# Patient Record
Sex: MALE | Race: WHITE | Employment: OTHER | URBAN - METROPOLITAN AREA
[De-identification: names, ages, dates, MRNs, and addresses within clinical notes are randomized per-mention and may not be internally consistent; named-entity substitution may affect disease eponyms.]

---

## 2017-01-03 ENCOUNTER — APPOINTMENT (OUTPATIENT)
Dept: GENERAL RADIOLOGY | Facility: HOSPITAL | Age: 47
DRG: 896 | End: 2017-01-03
Attending: INTERNAL MEDICINE
Payer: MEDICARE

## 2017-01-03 PROCEDURE — 71010 XR CHEST AP PORTABLE  (CPT=71010): CPT

## 2017-07-12 ENCOUNTER — CHARTING TRANS (OUTPATIENT)
Dept: OTHER | Age: 47
End: 2017-07-12

## 2017-10-11 PROBLEM — G47.24: Status: ACTIVE | Noted: 2017-10-11

## 2017-10-11 PROBLEM — H35.179 RETROLENTAL FIBROPLASIA: Status: ACTIVE | Noted: 2017-10-11

## 2017-10-11 PROCEDURE — 84153 ASSAY OF PSA TOTAL: CPT | Performed by: INTERNAL MEDICINE

## 2017-10-11 PROCEDURE — 82746 ASSAY OF FOLIC ACID SERUM: CPT | Performed by: INTERNAL MEDICINE

## 2017-10-11 PROCEDURE — 82607 VITAMIN B-12: CPT | Performed by: INTERNAL MEDICINE

## 2018-01-01 ENCOUNTER — ANESTHESIA EVENT (OUTPATIENT)
Dept: SURGERY | Facility: HOSPITAL | Age: 48
DRG: 493 | End: 2018-01-01
Payer: MEDICARE

## 2018-01-01 ENCOUNTER — ANESTHESIA (OUTPATIENT)
Dept: SURGERY | Facility: HOSPITAL | Age: 48
DRG: 493 | End: 2018-01-01
Payer: MEDICARE

## 2018-01-01 ENCOUNTER — APPOINTMENT (OUTPATIENT)
Dept: CT IMAGING | Facility: HOSPITAL | Age: 48
DRG: 493 | End: 2018-01-01
Attending: EMERGENCY MEDICINE
Payer: MEDICARE

## 2018-01-01 ENCOUNTER — APPOINTMENT (OUTPATIENT)
Dept: GENERAL RADIOLOGY | Facility: HOSPITAL | Age: 48
DRG: 493 | End: 2018-01-01
Attending: EMERGENCY MEDICINE
Payer: MEDICARE

## 2018-01-01 ENCOUNTER — APPOINTMENT (OUTPATIENT)
Dept: CT IMAGING | Facility: HOSPITAL | Age: 48
DRG: 493 | End: 2018-01-01
Attending: ORTHOPAEDIC SURGERY
Payer: MEDICARE

## 2018-01-01 ENCOUNTER — HOSPITAL ENCOUNTER (INPATIENT)
Facility: HOSPITAL | Age: 48
LOS: 11 days | Discharge: NURSING, REHAB OR LONG-TERM CARE | DRG: 493 | End: 2018-01-01
Attending: EMERGENCY MEDICINE | Admitting: INTERNAL MEDICINE
Payer: MEDICARE

## 2018-01-01 ENCOUNTER — APPOINTMENT (OUTPATIENT)
Dept: GENERAL RADIOLOGY | Facility: HOSPITAL | Age: 48
DRG: 493 | End: 2018-01-01
Attending: ORTHOPAEDIC SURGERY
Payer: MEDICARE

## 2018-01-01 ENCOUNTER — APPOINTMENT (OUTPATIENT)
Dept: GENERAL RADIOLOGY | Facility: HOSPITAL | Age: 48
DRG: 493 | End: 2018-01-01
Attending: HOSPITALIST
Payer: MEDICARE

## 2018-01-01 VITALS
TEMPERATURE: 98 F | DIASTOLIC BLOOD PRESSURE: 67 MMHG | OXYGEN SATURATION: 97 % | SYSTOLIC BLOOD PRESSURE: 97 MMHG | WEIGHT: 161.69 LBS | RESPIRATION RATE: 18 BRPM | BODY MASS INDEX: 22.64 KG/M2 | HEIGHT: 71 IN | HEART RATE: 145 BPM

## 2018-01-01 DIAGNOSIS — S82.892A CLOSED FRACTURE OF LEFT ANKLE, INITIAL ENCOUNTER: Primary | ICD-10-CM

## 2018-01-01 DIAGNOSIS — F10.10 ALCOHOL ABUSE: ICD-10-CM

## 2018-01-01 DIAGNOSIS — D69.6 THROMBOCYTOPENIA (HCC): ICD-10-CM

## 2018-01-01 DIAGNOSIS — S09.90XA INJURY OF HEAD, INITIAL ENCOUNTER: ICD-10-CM

## 2018-01-01 PROCEDURE — 99233 SBSQ HOSP IP/OBS HIGH 50: CPT | Performed by: OTHER

## 2018-01-01 PROCEDURE — 0QSK04Z REPOSITION LEFT FIBULA WITH INTERNAL FIXATION DEVICE, OPEN APPROACH: ICD-10-PCS | Performed by: ORTHOPAEDIC SURGERY

## 2018-01-01 PROCEDURE — 0QSHXZZ REPOSITION LEFT TIBIA, EXTERNAL APPROACH: ICD-10-PCS | Performed by: ORTHOPAEDIC SURGERY

## 2018-01-01 PROCEDURE — 76001 XR C-ARM FLUORO >1 HOUR  (CPT=76001): CPT | Performed by: ORTHOPAEDIC SURGERY

## 2018-01-01 PROCEDURE — 0QSH04Z REPOSITION LEFT TIBIA WITH INTERNAL FIXATION DEVICE, OPEN APPROACH: ICD-10-PCS | Performed by: ORTHOPAEDIC SURGERY

## 2018-01-01 PROCEDURE — 0SSG04Z REPOSITION LEFT ANKLE JOINT WITH INTERNAL FIXATION DEVICE, OPEN APPROACH: ICD-10-PCS | Performed by: ORTHOPAEDIC SURGERY

## 2018-01-01 PROCEDURE — 70450 CT HEAD/BRAIN W/O DYE: CPT | Performed by: EMERGENCY MEDICINE

## 2018-01-01 PROCEDURE — 30233R1 TRANSFUSION OF NONAUTOLOGOUS PLATELETS INTO PERIPHERAL VEIN, PERCUTANEOUS APPROACH: ICD-10-PCS | Performed by: INTERNAL MEDICINE

## 2018-01-01 PROCEDURE — 71045 X-RAY EXAM CHEST 1 VIEW: CPT | Performed by: HOSPITALIST

## 2018-01-01 PROCEDURE — 90792 PSYCH DIAG EVAL W/MED SRVCS: CPT | Performed by: OTHER

## 2018-01-01 PROCEDURE — 73700 CT LOWER EXTREMITY W/O DYE: CPT | Performed by: ORTHOPAEDIC SURGERY

## 2018-01-01 PROCEDURE — 73610 X-RAY EXAM OF ANKLE: CPT | Performed by: EMERGENCY MEDICINE

## 2018-01-01 PROCEDURE — 0L8P3ZZ DIVISION OF LEFT LOWER LEG TENDON, PERCUTANEOUS APPROACH: ICD-10-PCS | Performed by: ORTHOPAEDIC SURGERY

## 2018-01-01 PROCEDURE — 70486 CT MAXILLOFACIAL W/O DYE: CPT | Performed by: EMERGENCY MEDICINE

## 2018-01-01 DEVICE — IMPLANTABLE DEVICE: Type: IMPLANTABLE DEVICE | Site: ANKLE | Status: FUNCTIONAL

## 2018-01-01 DEVICE — SCREW CORT 3.5X14 2348-14-35: Type: IMPLANTABLE DEVICE | Site: ANKLE | Status: FUNCTIONAL

## 2018-01-01 DEVICE — SCREW CORT 3.5X16 2348-16-35: Type: IMPLANTABLE DEVICE | Site: ANKLE | Status: FUNCTIONAL

## 2018-01-01 DEVICE — ONE (1) PACKAGE CONTAINING 2.5CC
Type: IMPLANTABLE DEVICE | Site: ANKLE | Status: FUNCTIONAL
Brand: OSTEOSELECT PLUS DBM PUTTY 2.5CC

## 2018-01-01 RX ORDER — MAGNESIUM OXIDE 400 MG (241.3 MG MAGNESIUM) TABLET
800 TABLET ONCE
Status: COMPLETED | OUTPATIENT
Start: 2018-01-01 | End: 2018-01-01

## 2018-01-01 RX ORDER — POTASSIUM CHLORIDE 14.9 MG/ML
20 INJECTION INTRAVENOUS ONCE
Status: COMPLETED | OUTPATIENT
Start: 2018-01-01 | End: 2018-01-01

## 2018-01-01 RX ORDER — HYDRALAZINE HYDROCHLORIDE 20 MG/ML
10 INJECTION INTRAMUSCULAR; INTRAVENOUS EVERY 4 HOURS PRN
Status: DISCONTINUED | OUTPATIENT
Start: 2018-01-01 | End: 2018-01-01

## 2018-01-01 RX ORDER — MORPHINE SULFATE 10 MG/ML
6 INJECTION, SOLUTION INTRAMUSCULAR; INTRAVENOUS EVERY 10 MIN PRN
Status: DISCONTINUED | OUTPATIENT
Start: 2018-01-01 | End: 2018-01-01 | Stop reason: HOSPADM

## 2018-01-01 RX ORDER — MORPHINE SULFATE 2 MG/ML
1 INJECTION, SOLUTION INTRAMUSCULAR; INTRAVENOUS EVERY 2 HOUR PRN
Status: DISCONTINUED | OUTPATIENT
Start: 2018-01-01 | End: 2018-01-01

## 2018-01-01 RX ORDER — SODIUM CHLORIDE 9 MG/ML
INJECTION, SOLUTION INTRAVENOUS ONCE
Status: DISCONTINUED | OUTPATIENT
Start: 2018-01-01 | End: 2018-01-01

## 2018-01-01 RX ORDER — DIAZEPAM 10 MG/1
10 TABLET ORAL 2 TIMES DAILY
Status: DISCONTINUED | OUTPATIENT
Start: 2018-01-01 | End: 2018-01-01

## 2018-01-01 RX ORDER — POTASSIUM CHLORIDE 20 MEQ/1
40 TABLET, EXTENDED RELEASE ORAL ONCE
Status: COMPLETED | OUTPATIENT
Start: 2018-01-01 | End: 2018-01-01

## 2018-01-01 RX ORDER — MAGNESIUM SULFATE HEPTAHYDRATE 40 MG/ML
2 INJECTION, SOLUTION INTRAVENOUS ONCE
Status: COMPLETED | OUTPATIENT
Start: 2018-01-01 | End: 2018-01-01

## 2018-01-01 RX ORDER — SODIUM CHLORIDE 0.9 % (FLUSH) 0.9 %
10 SYRINGE (ML) INJECTION AS NEEDED
Status: DISCONTINUED | OUTPATIENT
Start: 2018-01-01 | End: 2018-01-01

## 2018-01-01 RX ORDER — MELATONIN
325 2 TIMES DAILY WITH MEALS
Qty: 60 TABLET | Refills: 0 | Status: ON HOLD | OUTPATIENT
Start: 2018-01-01 | End: 2019-01-01

## 2018-01-01 RX ORDER — PHENYLEPHRINE HCL 10 MG/ML
VIAL (ML) INJECTION AS NEEDED
Status: DISCONTINUED | OUTPATIENT
Start: 2018-01-01 | End: 2018-01-01 | Stop reason: SURG

## 2018-01-01 RX ORDER — SODIUM PHOSPHATE, DIBASIC AND SODIUM PHOSPHATE, MONOBASIC 7; 19 G/133ML; G/133ML
1 ENEMA RECTAL ONCE AS NEEDED
Status: DISCONTINUED | OUTPATIENT
Start: 2018-01-01 | End: 2018-01-01

## 2018-01-01 RX ORDER — MORPHINE SULFATE 4 MG/ML
2 INJECTION, SOLUTION INTRAMUSCULAR; INTRAVENOUS EVERY 10 MIN PRN
Status: DISCONTINUED | OUTPATIENT
Start: 2018-01-01 | End: 2018-01-01 | Stop reason: HOSPADM

## 2018-01-01 RX ORDER — HYDROCODONE BITARTRATE AND ACETAMINOPHEN 5; 325 MG/1; MG/1
1 TABLET ORAL EVERY 6 HOURS PRN
Status: DISCONTINUED | OUTPATIENT
Start: 2018-01-01 | End: 2018-01-01

## 2018-01-01 RX ORDER — ONDANSETRON 2 MG/ML
4 INJECTION INTRAMUSCULAR; INTRAVENOUS EVERY 6 HOURS PRN
Status: DISCONTINUED | OUTPATIENT
Start: 2018-01-01 | End: 2018-01-01

## 2018-01-01 RX ORDER — DIPHENHYDRAMINE HCL 25 MG
25 CAPSULE ORAL EVERY 6 HOURS PRN
Status: DISCONTINUED | OUTPATIENT
Start: 2018-01-01 | End: 2018-01-01

## 2018-01-01 RX ORDER — ACETAMINOPHEN 325 MG/1
650 TABLET ORAL ONCE
Status: COMPLETED | OUTPATIENT
Start: 2018-01-01 | End: 2018-01-01

## 2018-01-01 RX ORDER — BISACODYL 10 MG
10 SUPPOSITORY, RECTAL RECTAL
Status: DISCONTINUED | OUTPATIENT
Start: 2018-01-01 | End: 2018-01-01

## 2018-01-01 RX ORDER — SODIUM CHLORIDE 9 MG/ML
INJECTION, SOLUTION INTRAVENOUS CONTINUOUS
Status: DISCONTINUED | OUTPATIENT
Start: 2018-01-01 | End: 2018-01-01

## 2018-01-01 RX ORDER — HYDRALAZINE HYDROCHLORIDE 20 MG/ML
5 INJECTION INTRAMUSCULAR; INTRAVENOUS ONCE
Status: COMPLETED | OUTPATIENT
Start: 2018-01-01 | End: 2018-01-01

## 2018-01-01 RX ORDER — ACETAMINOPHEN 325 MG/1
650 TABLET ORAL ONCE
Status: DISCONTINUED | OUTPATIENT
Start: 2018-01-01 | End: 2018-01-01

## 2018-01-01 RX ORDER — POTASSIUM CHLORIDE 14.9 MG/ML
20 INJECTION INTRAVENOUS ONCE
Status: DISCONTINUED | OUTPATIENT
Start: 2018-01-01 | End: 2018-01-01

## 2018-01-01 RX ORDER — LORAZEPAM 1 MG/1
2 TABLET ORAL
Status: DISCONTINUED | OUTPATIENT
Start: 2018-01-01 | End: 2018-01-01

## 2018-01-01 RX ORDER — LORAZEPAM 2 MG/ML
1 INJECTION INTRAMUSCULAR ONCE
Status: DISCONTINUED | OUTPATIENT
Start: 2018-01-01 | End: 2018-01-01

## 2018-01-01 RX ORDER — ONDANSETRON 2 MG/ML
INJECTION INTRAMUSCULAR; INTRAVENOUS AS NEEDED
Status: DISCONTINUED | OUTPATIENT
Start: 2018-01-01 | End: 2018-01-01 | Stop reason: SURG

## 2018-01-01 RX ORDER — DIAZEPAM 5 MG/1
5 TABLET ORAL 4 TIMES DAILY
Status: DISCONTINUED | OUTPATIENT
Start: 2018-01-01 | End: 2018-01-01

## 2018-01-01 RX ORDER — LABETALOL HYDROCHLORIDE 5 MG/ML
5 INJECTION, SOLUTION INTRAVENOUS ONCE
Status: COMPLETED | OUTPATIENT
Start: 2018-01-01 | End: 2018-01-01

## 2018-01-01 RX ORDER — MELATONIN
100 DAILY
Status: DISCONTINUED | OUTPATIENT
Start: 2018-01-01 | End: 2018-01-01

## 2018-01-01 RX ORDER — MORPHINE SULFATE 2 MG/ML
2 INJECTION, SOLUTION INTRAMUSCULAR; INTRAVENOUS EVERY 2 HOUR PRN
Status: DISCONTINUED | OUTPATIENT
Start: 2018-01-01 | End: 2018-01-01

## 2018-01-01 RX ORDER — SODIUM CHLORIDE, SODIUM LACTATE, POTASSIUM CHLORIDE, CALCIUM CHLORIDE 600; 310; 30; 20 MG/100ML; MG/100ML; MG/100ML; MG/100ML
INJECTION, SOLUTION INTRAVENOUS CONTINUOUS
Status: DISCONTINUED | OUTPATIENT
Start: 2018-01-01 | End: 2018-01-01 | Stop reason: HOSPADM

## 2018-01-01 RX ORDER — VENLAFAXINE HYDROCHLORIDE 150 MG/1
150 CAPSULE, EXTENDED RELEASE ORAL DAILY
Status: DISCONTINUED | OUTPATIENT
Start: 2018-01-01 | End: 2018-01-01

## 2018-01-01 RX ORDER — MAGNESIUM OXIDE 400 MG (241.3 MG MAGNESIUM) TABLET
400 TABLET ONCE
Status: COMPLETED | OUTPATIENT
Start: 2018-01-01 | End: 2018-01-01

## 2018-01-01 RX ORDER — MORPHINE SULFATE 4 MG/ML
4 INJECTION, SOLUTION INTRAMUSCULAR; INTRAVENOUS EVERY 10 MIN PRN
Status: DISCONTINUED | OUTPATIENT
Start: 2018-01-01 | End: 2018-01-01 | Stop reason: HOSPADM

## 2018-01-01 RX ORDER — METOCLOPRAMIDE HYDROCHLORIDE 5 MG/ML
10 INJECTION INTRAMUSCULAR; INTRAVENOUS EVERY 8 HOURS PRN
Status: DISCONTINUED | OUTPATIENT
Start: 2018-01-01 | End: 2018-01-01

## 2018-01-01 RX ORDER — DIAZEPAM 5 MG/1
5 TABLET ORAL EVERY 8 HOURS PRN
Status: DISCONTINUED | OUTPATIENT
Start: 2018-01-01 | End: 2018-01-01

## 2018-01-01 RX ORDER — POLYETHYLENE GLYCOL 3350 17 G/17G
17 POWDER, FOR SOLUTION ORAL DAILY PRN
Status: DISCONTINUED | OUTPATIENT
Start: 2018-01-01 | End: 2018-01-01

## 2018-01-01 RX ORDER — DIAZEPAM 5 MG/1
10 TABLET ORAL 4 TIMES DAILY
Status: DISCONTINUED | OUTPATIENT
Start: 2018-01-01 | End: 2018-01-01

## 2018-01-01 RX ORDER — MAGNESIUM SULFATE HEPTAHYDRATE 40 MG/ML
2 INJECTION, SOLUTION INTRAVENOUS ONCE
Status: DISCONTINUED | OUTPATIENT
Start: 2018-01-01 | End: 2018-01-01

## 2018-01-01 RX ORDER — LORAZEPAM 2 MG/ML
2 INJECTION INTRAMUSCULAR
Status: DISCONTINUED | OUTPATIENT
Start: 2018-01-01 | End: 2018-01-01

## 2018-01-01 RX ORDER — DOCUSATE SODIUM 100 MG/1
100 CAPSULE, LIQUID FILLED ORAL 2 TIMES DAILY
Status: DISCONTINUED | OUTPATIENT
Start: 2018-01-01 | End: 2018-01-01

## 2018-01-01 RX ORDER — VENLAFAXINE HYDROCHLORIDE 75 MG/1
75 CAPSULE, EXTENDED RELEASE ORAL DAILY
Qty: 30 CAPSULE | Refills: 0 | Status: SHIPPED | OUTPATIENT
Start: 2018-01-01 | End: 2019-01-01

## 2018-01-01 RX ORDER — FOLIC ACID 1 MG/1
1 TABLET ORAL DAILY
Status: DISCONTINUED | OUTPATIENT
Start: 2018-01-01 | End: 2018-01-01

## 2018-01-01 RX ORDER — MELATONIN
325 2 TIMES DAILY WITH MEALS
Status: DISCONTINUED | OUTPATIENT
Start: 2018-01-01 | End: 2018-01-01

## 2018-01-01 RX ORDER — NALTREXONE HYDROCHLORIDE 50 MG/1
25 TABLET, FILM COATED ORAL DAILY
Status: DISCONTINUED | OUTPATIENT
Start: 2018-01-01 | End: 2018-01-01

## 2018-01-01 RX ORDER — NALOXONE HYDROCHLORIDE 0.4 MG/ML
80 INJECTION, SOLUTION INTRAMUSCULAR; INTRAVENOUS; SUBCUTANEOUS AS NEEDED
Status: DISCONTINUED | OUTPATIENT
Start: 2018-01-01 | End: 2018-01-01 | Stop reason: HOSPADM

## 2018-01-01 RX ORDER — HALOPERIDOL 5 MG/ML
1 INJECTION INTRAMUSCULAR
Status: DISCONTINUED | OUTPATIENT
Start: 2018-01-01 | End: 2018-01-01

## 2018-01-01 RX ORDER — ASPIRIN 325 MG
325 TABLET ORAL 2 TIMES DAILY
Status: DISCONTINUED | OUTPATIENT
Start: 2018-01-01 | End: 2018-01-01

## 2018-01-01 RX ORDER — LORAZEPAM 1 MG/1
1 TABLET ORAL
Status: DISCONTINUED | OUTPATIENT
Start: 2018-01-01 | End: 2018-01-01

## 2018-01-01 RX ORDER — PSEUDOEPHEDRINE HCL 30 MG
100 TABLET ORAL 2 TIMES DAILY
Qty: 60 CAPSULE | Refills: 0 | Status: SHIPPED | OUTPATIENT
Start: 2018-01-01 | End: 2019-01-01 | Stop reason: ALTCHOICE

## 2018-01-01 RX ORDER — CEFAZOLIN SODIUM/WATER 2 G/20 ML
SYRINGE (ML) INTRAVENOUS AS NEEDED
Status: DISCONTINUED | OUTPATIENT
Start: 2018-01-01 | End: 2018-01-01 | Stop reason: SURG

## 2018-01-01 RX ORDER — LIDOCAINE HYDROCHLORIDE 10 MG/ML
INJECTION, SOLUTION EPIDURAL; INFILTRATION; INTRACAUDAL; PERINEURAL AS NEEDED
Status: DISCONTINUED | OUTPATIENT
Start: 2018-01-01 | End: 2018-01-01 | Stop reason: SURG

## 2018-01-01 RX ORDER — SODIUM CHLORIDE 9 MG/ML
INJECTION, SOLUTION INTRAVENOUS
Status: COMPLETED
Start: 2018-01-01 | End: 2018-01-01

## 2018-01-01 RX ORDER — MAGNESIUM OXIDE 400 MG (241.3 MG MAGNESIUM) TABLET
400 TABLET ONCE
Qty: 1 TABLET | Refills: 0 | Status: SHIPPED | OUTPATIENT
Start: 2018-01-01 | End: 2018-01-01

## 2018-01-01 RX ORDER — MORPHINE SULFATE 4 MG/ML
INJECTION, SOLUTION INTRAMUSCULAR; INTRAVENOUS
Status: COMPLETED
Start: 2018-01-01 | End: 2018-01-01

## 2018-01-01 RX ORDER — QUETIAPINE 50 MG/1
50 TABLET, FILM COATED ORAL NIGHTLY
Qty: 30 TABLET | Refills: 0 | Status: SHIPPED | OUTPATIENT
Start: 2018-01-01 | End: 2019-01-01

## 2018-01-01 RX ORDER — HEPARIN SODIUM 5000 [USP'U]/ML
5000 INJECTION, SOLUTION INTRAVENOUS; SUBCUTANEOUS EVERY 8 HOURS
Status: COMPLETED | OUTPATIENT
Start: 2018-01-01 | End: 2018-01-01

## 2018-01-01 RX ORDER — DIAZEPAM 10 MG/1
10 TABLET ORAL DAILY
Status: DISCONTINUED | OUTPATIENT
Start: 2018-01-01 | End: 2018-01-01

## 2018-01-01 RX ORDER — DOXEPIN HYDROCHLORIDE 50 MG/1
1 CAPSULE ORAL DAILY
Qty: 30 TABLET | Refills: 0 | Status: ON HOLD | OUTPATIENT
Start: 2018-01-01 | End: 2019-01-01

## 2018-01-01 RX ORDER — LORAZEPAM 2 MG/ML
1 INJECTION INTRAMUSCULAR ONCE
Status: COMPLETED | OUTPATIENT
Start: 2018-01-01 | End: 2018-01-01

## 2018-01-01 RX ORDER — SODIUM CHLORIDE 0.9 % (FLUSH) 0.9 %
3 SYRINGE (ML) INJECTION AS NEEDED
Status: DISCONTINUED | OUTPATIENT
Start: 2018-01-01 | End: 2018-01-01

## 2018-01-01 RX ORDER — HEPARIN SODIUM 5000 [USP'U]/ML
5000 INJECTION, SOLUTION INTRAVENOUS; SUBCUTANEOUS EVERY 8 HOURS
Status: DISCONTINUED | OUTPATIENT
Start: 2018-01-01 | End: 2018-01-01

## 2018-01-01 RX ORDER — MORPHINE SULFATE 4 MG/ML
4 INJECTION, SOLUTION INTRAMUSCULAR; INTRAVENOUS EVERY 2 HOUR PRN
Status: DISCONTINUED | OUTPATIENT
Start: 2018-01-01 | End: 2018-01-01

## 2018-01-01 RX ORDER — MIDAZOLAM HYDROCHLORIDE 1 MG/ML
INJECTION INTRAMUSCULAR; INTRAVENOUS AS NEEDED
Status: DISCONTINUED | OUTPATIENT
Start: 2018-01-01 | End: 2018-01-01 | Stop reason: SURG

## 2018-01-01 RX ORDER — SODIUM CHLORIDE 9 MG/ML
INJECTION, SOLUTION INTRAVENOUS ONCE
Status: COMPLETED | OUTPATIENT
Start: 2018-01-01 | End: 2018-01-01

## 2018-01-01 RX ORDER — POLYETHYLENE GLYCOL 3350 17 G/17G
17 POWDER, FOR SOLUTION ORAL DAILY PRN
Qty: 7 EACH | Refills: 0 | Status: SHIPPED | OUTPATIENT
Start: 2018-01-01 | End: 2019-01-01 | Stop reason: ALTCHOICE

## 2018-01-01 RX ORDER — MELATONIN
100 DAILY
Qty: 30 TABLET | Refills: 0 | Status: ON HOLD | OUTPATIENT
Start: 2018-01-01 | End: 2019-01-01

## 2018-01-01 RX ORDER — NALTREXONE HYDROCHLORIDE 50 MG/1
25 TABLET, FILM COATED ORAL DAILY
Qty: 30 TABLET | Refills: 0 | Status: ON HOLD | OUTPATIENT
Start: 2018-01-01 | End: 2019-01-01

## 2018-01-01 RX ORDER — HYDROCODONE BITARTRATE AND ACETAMINOPHEN 5; 325 MG/1; MG/1
1 TABLET ORAL AS NEEDED
Status: DISCONTINUED | OUTPATIENT
Start: 2018-01-01 | End: 2018-01-01 | Stop reason: HOSPADM

## 2018-01-01 RX ORDER — HYDROCODONE BITARTRATE AND ACETAMINOPHEN 10; 325 MG/1; MG/1
1 TABLET ORAL EVERY 4 HOURS PRN
Status: DISCONTINUED | OUTPATIENT
Start: 2018-01-01 | End: 2018-01-01

## 2018-01-01 RX ORDER — NALTREXONE HYDROCHLORIDE 50 MG/1
50 TABLET, FILM COATED ORAL DAILY
Status: DISCONTINUED | OUTPATIENT
Start: 2018-01-01 | End: 2018-01-01

## 2018-01-01 RX ORDER — HYDROCODONE BITARTRATE AND ACETAMINOPHEN 10; 325 MG/1; MG/1
2 TABLET ORAL EVERY 4 HOURS PRN
Status: DISCONTINUED | OUTPATIENT
Start: 2018-01-01 | End: 2018-01-01

## 2018-01-01 RX ORDER — CEFAZOLIN SODIUM/WATER 2 G/20 ML
2 SYRINGE (ML) INTRAVENOUS EVERY 8 HOURS
Status: COMPLETED | OUTPATIENT
Start: 2018-01-01 | End: 2018-01-01

## 2018-01-01 RX ORDER — VENLAFAXINE HYDROCHLORIDE 75 MG/1
75 CAPSULE, EXTENDED RELEASE ORAL DAILY
Status: DISCONTINUED | OUTPATIENT
Start: 2018-01-01 | End: 2018-01-01

## 2018-01-01 RX ORDER — ACETAMINOPHEN 325 MG/1
650 TABLET ORAL EVERY 4 HOURS PRN
Status: DISCONTINUED | OUTPATIENT
Start: 2018-01-01 | End: 2018-01-01

## 2018-01-01 RX ORDER — ASPIRIN 325 MG
325 TABLET ORAL 2 TIMES DAILY
Qty: 60 TABLET | Refills: 0 | Status: ON HOLD | OUTPATIENT
Start: 2018-01-01 | End: 2019-01-01

## 2018-01-01 RX ORDER — MORPHINE SULFATE 4 MG/ML
4 INJECTION, SOLUTION INTRAMUSCULAR; INTRAVENOUS ONCE
Status: COMPLETED | OUTPATIENT
Start: 2018-01-01 | End: 2018-01-01

## 2018-01-01 RX ORDER — LORAZEPAM 2 MG/ML
1 INJECTION INTRAMUSCULAR
Status: DISCONTINUED | OUTPATIENT
Start: 2018-01-01 | End: 2018-01-01

## 2018-01-01 RX ORDER — HYDROCODONE BITARTRATE AND ACETAMINOPHEN 10; 325 MG/1; MG/1
1 TABLET ORAL EVERY 6 HOURS PRN
Qty: 50 TABLET | Refills: 0 | Status: SHIPPED | OUTPATIENT
Start: 2018-01-01 | End: 2019-01-01

## 2018-01-01 RX ORDER — DEXAMETHASONE SODIUM PHOSPHATE 4 MG/ML
VIAL (ML) INJECTION AS NEEDED
Status: DISCONTINUED | OUTPATIENT
Start: 2018-01-01 | End: 2018-01-01 | Stop reason: SURG

## 2018-01-01 RX ORDER — HALOPERIDOL 5 MG/ML
0.25 INJECTION INTRAMUSCULAR ONCE AS NEEDED
Status: DISCONTINUED | OUTPATIENT
Start: 2018-01-01 | End: 2018-01-01 | Stop reason: HOSPADM

## 2018-01-01 RX ORDER — TRAMADOL HYDROCHLORIDE 50 MG/1
50 TABLET ORAL EVERY 6 HOURS PRN
Status: DISCONTINUED | OUTPATIENT
Start: 2018-01-01 | End: 2018-01-01

## 2018-01-01 RX ORDER — HALOPERIDOL 5 MG/ML
1 INJECTION INTRAMUSCULAR EVERY 2 HOUR PRN
Status: DISCONTINUED | OUTPATIENT
Start: 2018-01-01 | End: 2018-01-01

## 2018-01-01 RX ORDER — BUPIVACAINE HYDROCHLORIDE 2.5 MG/ML
INJECTION, SOLUTION EPIDURAL; INFILTRATION; INTRACAUDAL AS NEEDED
Status: DISCONTINUED | OUTPATIENT
Start: 2018-01-01 | End: 2018-01-01 | Stop reason: HOSPADM

## 2018-01-01 RX ORDER — HYDRALAZINE HYDROCHLORIDE 25 MG/1
25 TABLET, FILM COATED ORAL EVERY 8 HOURS PRN
Status: DISCONTINUED | OUTPATIENT
Start: 2018-01-01 | End: 2018-01-01

## 2018-01-01 RX ORDER — DOXEPIN HYDROCHLORIDE 50 MG/1
1 CAPSULE ORAL DAILY
Status: DISCONTINUED | OUTPATIENT
Start: 2018-01-01 | End: 2018-01-01

## 2018-01-01 RX ORDER — ONDANSETRON 2 MG/ML
4 INJECTION INTRAMUSCULAR; INTRAVENOUS ONCE AS NEEDED
Status: DISCONTINUED | OUTPATIENT
Start: 2018-01-01 | End: 2018-01-01 | Stop reason: HOSPADM

## 2018-01-01 RX ORDER — CLONIDINE 0.1 MG/24H
1 PATCH, EXTENDED RELEASE TRANSDERMAL WEEKLY
Status: DISCONTINUED | OUTPATIENT
Start: 2018-01-01 | End: 2018-01-01

## 2018-01-01 RX ORDER — HYDROCODONE BITARTRATE AND ACETAMINOPHEN 5; 325 MG/1; MG/1
2 TABLET ORAL AS NEEDED
Status: DISCONTINUED | OUTPATIENT
Start: 2018-01-01 | End: 2018-01-01 | Stop reason: HOSPADM

## 2018-01-01 RX ORDER — MORPHINE SULFATE 4 MG/ML
2 INJECTION, SOLUTION INTRAMUSCULAR; INTRAVENOUS ONCE
Status: COMPLETED | OUTPATIENT
Start: 2018-01-01 | End: 2018-01-01

## 2018-01-01 RX ORDER — QUETIAPINE 25 MG/1
50 TABLET, FILM COATED ORAL NIGHTLY
Status: DISCONTINUED | OUTPATIENT
Start: 2018-01-01 | End: 2018-01-01

## 2018-01-01 RX ADMIN — MIDAZOLAM HYDROCHLORIDE 5 MG: 1 INJECTION INTRAMUSCULAR; INTRAVENOUS at 07:46:00

## 2018-01-01 RX ADMIN — MIDAZOLAM HYDROCHLORIDE 5 MG: 1 INJECTION INTRAMUSCULAR; INTRAVENOUS at 07:50:00

## 2018-01-01 RX ADMIN — SODIUM CHLORIDE: 9 INJECTION, SOLUTION INTRAVENOUS at 07:39:00

## 2018-01-01 RX ADMIN — CEFAZOLIN SODIUM/WATER 2 G: 2 G/20 ML SYRINGE (ML) INTRAVENOUS at 12:24:00

## 2018-01-01 RX ADMIN — SODIUM CHLORIDE, SODIUM LACTATE, POTASSIUM CHLORIDE, CALCIUM CHLORIDE: 600; 310; 30; 20 INJECTION, SOLUTION INTRAVENOUS at 12:04:00

## 2018-01-01 RX ADMIN — SODIUM CHLORIDE, SODIUM LACTATE, POTASSIUM CHLORIDE, CALCIUM CHLORIDE: 600; 310; 30; 20 INJECTION, SOLUTION INTRAVENOUS at 14:06:00

## 2018-01-01 RX ADMIN — PHENYLEPHRINE HCL 100 MCG: 10 MG/ML VIAL (ML) INJECTION at 08:04:00

## 2018-01-01 RX ADMIN — MIDAZOLAM HYDROCHLORIDE 2 MG: 1 INJECTION INTRAMUSCULAR; INTRAVENOUS at 12:07:00

## 2018-01-01 RX ADMIN — DEXAMETHASONE SODIUM PHOSPHATE 4 MG: 4 MG/ML VIAL (ML) INJECTION at 12:20:00

## 2018-01-01 RX ADMIN — LIDOCAINE HYDROCHLORIDE 50 MG: 10 INJECTION, SOLUTION EPIDURAL; INFILTRATION; INTRACAUDAL; PERINEURAL at 12:07:00

## 2018-01-01 RX ADMIN — ONDANSETRON 4 MG: 2 INJECTION INTRAMUSCULAR; INTRAVENOUS at 12:20:00

## 2018-01-01 RX ADMIN — SODIUM CHLORIDE: 9 INJECTION, SOLUTION INTRAVENOUS at 08:20:00

## 2018-01-29 ENCOUNTER — HOSPITAL ENCOUNTER (INPATIENT)
Facility: HOSPITAL | Age: 48
LOS: 10 days | Discharge: NURSING, REHAB OR LONG-TERM CARE | DRG: 813 | End: 2018-02-08
Attending: EMERGENCY MEDICINE | Admitting: INTERNAL MEDICINE
Payer: MEDICARE

## 2018-01-29 DIAGNOSIS — G47.24: ICD-10-CM

## 2018-01-29 DIAGNOSIS — F10.230 ALCOHOL WITHDRAWAL SEIZURE WITHOUT COMPLICATION (HCC): ICD-10-CM

## 2018-01-29 DIAGNOSIS — R19.5 OCCULT BLOOD IN STOOLS: ICD-10-CM

## 2018-01-29 DIAGNOSIS — R56.9 ALCOHOL WITHDRAWAL SEIZURE WITHOUT COMPLICATION (HCC): ICD-10-CM

## 2018-01-29 DIAGNOSIS — D69.6 THROMBOCYTOPENIA (HCC): Primary | ICD-10-CM

## 2018-01-29 DIAGNOSIS — G47.00 INSOMNIA, UNSPECIFIED TYPE: ICD-10-CM

## 2018-01-29 LAB
ANION GAP SERPL CALC-SCNC: 16 MMOL/L (ref 0–18)
ANTIBODY SCREEN: NEGATIVE
APTT PPP: 33 SECONDS (ref 23.2–35.3)
BUN SERPL-MCNC: 4 MG/DL (ref 8–20)
BUN/CREAT SERPL: 7.7 (ref 10–20)
CALCIUM SERPL-MCNC: 8.9 MG/DL (ref 8.5–10.5)
CHLORIDE SERPL-SCNC: 96 MMOL/L (ref 95–110)
CO2 SERPL-SCNC: 22 MMOL/L (ref 22–32)
CREAT SERPL-MCNC: 0.52 MG/DL (ref 0.5–1.5)
GLUCOSE SERPL-MCNC: 113 MG/DL (ref 70–99)
INR BLD: 1.1 (ref 0.9–1.2)
MAGNESIUM SERPL-MCNC: 1.5 MG/DL (ref 1.8–2.5)
OSMOLALITY UR CALC.SUM OF ELEC: 276 MOSM/KG (ref 275–295)
POTASSIUM SERPL-SCNC: 3.2 MMOL/L (ref 3.3–5.1)
PROTHROMBIN TIME: 13.9 SECONDS (ref 11.8–14.5)
RH BLOOD TYPE: NEGATIVE
SODIUM SERPL-SCNC: 134 MMOL/L (ref 136–144)

## 2018-01-29 RX ORDER — ACETAMINOPHEN 325 MG/1
650 TABLET ORAL EVERY 6 HOURS PRN
Status: DISCONTINUED | OUTPATIENT
Start: 2018-01-29 | End: 2018-02-05

## 2018-01-29 RX ORDER — MULTIVIT-MIN/IRON FUM/FOLIC AC 7.5 MG-4
1 TABLET ORAL DAILY
Status: ON HOLD | COMMUNITY
End: 2019-01-01

## 2018-01-29 RX ORDER — SODIUM PHOSPHATE, DIBASIC AND SODIUM PHOSPHATE, MONOBASIC 7; 19 G/133ML; G/133ML
1 ENEMA RECTAL ONCE AS NEEDED
Status: DISCONTINUED | OUTPATIENT
Start: 2018-01-29 | End: 2018-02-08

## 2018-01-29 RX ORDER — SODIUM CHLORIDE 9 MG/ML
INJECTION, SOLUTION INTRAVENOUS CONTINUOUS
Status: DISCONTINUED | OUTPATIENT
Start: 2018-01-29 | End: 2018-01-30

## 2018-01-29 RX ORDER — BISACODYL 10 MG
10 SUPPOSITORY, RECTAL RECTAL
Status: DISCONTINUED | OUTPATIENT
Start: 2018-01-29 | End: 2018-02-08

## 2018-01-29 RX ORDER — POLYETHYLENE GLYCOL 3350 17 G/17G
17 POWDER, FOR SOLUTION ORAL DAILY PRN
Status: DISCONTINUED | OUTPATIENT
Start: 2018-01-29 | End: 2018-02-08

## 2018-01-29 RX ORDER — SODIUM CHLORIDE 0.9 % (FLUSH) 0.9 %
3 SYRINGE (ML) INJECTION AS NEEDED
Status: DISCONTINUED | OUTPATIENT
Start: 2018-01-29 | End: 2018-02-08

## 2018-01-29 RX ORDER — MAGNESIUM SULFATE HEPTAHYDRATE 40 MG/ML
2 INJECTION, SOLUTION INTRAVENOUS ONCE
Status: COMPLETED | OUTPATIENT
Start: 2018-01-29 | End: 2018-01-29

## 2018-01-29 RX ORDER — POTASSIUM CHLORIDE 20 MEQ/1
40 TABLET, EXTENDED RELEASE ORAL ONCE
Status: COMPLETED | OUTPATIENT
Start: 2018-01-29 | End: 2018-01-29

## 2018-01-29 RX ORDER — DOCUSATE SODIUM 100 MG/1
100 CAPSULE, LIQUID FILLED ORAL 2 TIMES DAILY
Status: DISCONTINUED | OUTPATIENT
Start: 2018-01-29 | End: 2018-02-01

## 2018-01-29 RX ORDER — ONDANSETRON 2 MG/ML
4 INJECTION INTRAMUSCULAR; INTRAVENOUS EVERY 6 HOURS PRN
Status: DISCONTINUED | OUTPATIENT
Start: 2018-01-29 | End: 2018-02-08

## 2018-01-29 NOTE — ED NOTES
Labs sent, sister at bedside. Patient with tremors, hx of ETOH. Random bruising throughout body noted.

## 2018-01-29 NOTE — ED INITIAL ASSESSMENT (HPI)
Pt reports being seen at [address] [place] [address] had an xry done of right jaw. Pt has bruising to right jaw, and right hip that is purple in color. Pt reports \"The bruising is worse, and there is a lot of swelling\". Pt repots nasal bleeds \"constantly\".  Pt denies dizziness,

## 2018-01-30 ENCOUNTER — APPOINTMENT (OUTPATIENT)
Dept: ULTRASOUND IMAGING | Facility: HOSPITAL | Age: 48
DRG: 813 | End: 2018-01-30
Attending: NURSE PRACTITIONER
Payer: MEDICARE

## 2018-01-30 ENCOUNTER — APPOINTMENT (OUTPATIENT)
Dept: CT IMAGING | Facility: HOSPITAL | Age: 48
DRG: 813 | End: 2018-01-30
Attending: EMERGENCY MEDICINE
Payer: MEDICARE

## 2018-01-30 ENCOUNTER — APPOINTMENT (OUTPATIENT)
Dept: GENERAL RADIOLOGY | Facility: HOSPITAL | Age: 48
DRG: 813 | End: 2018-01-30
Attending: EMERGENCY MEDICINE
Payer: MEDICARE

## 2018-01-30 PROBLEM — R56.9 SEIZURE (HCC): Status: ACTIVE | Noted: 2018-01-30

## 2018-01-30 PROBLEM — H54.8 LEGALLY BLIND: Status: ACTIVE | Noted: 2018-01-30

## 2018-01-30 LAB
ALBUMIN SERPL BCP-MCNC: 3.9 G/DL (ref 3.5–4.8)
ALP SERPL-CCNC: 101 U/L (ref 32–100)
ALT SERPL-CCNC: 66 U/L (ref 17–63)
ANION GAP SERPL CALC-SCNC: 15 MMOL/L (ref 0–18)
AST SERPL-CCNC: 189 U/L (ref 15–41)
BASOPHILS # BLD: 0 K/UL (ref 0–0.2)
BASOPHILS # BLD: 0 K/UL (ref 0–0.2)
BASOPHILS NFR BLD: 0 %
BASOPHILS NFR BLD: 1 %
BILIRUB DIRECT SERPL-MCNC: 1 MG/DL (ref 0–0.2)
BILIRUB SERPL-MCNC: 2.6 MG/DL (ref 0.3–1.2)
BUN SERPL-MCNC: 3 MG/DL (ref 8–20)
BUN/CREAT SERPL: 5.7 (ref 10–20)
CALCIUM SERPL-MCNC: 9.2 MG/DL (ref 8.5–10.5)
CHLORIDE SERPL-SCNC: 96 MMOL/L (ref 95–110)
CO2 SERPL-SCNC: 22 MMOL/L (ref 22–32)
CREAT SERPL-MCNC: 0.53 MG/DL (ref 0.5–1.5)
EOSINOPHIL # BLD: 0 K/UL (ref 0–0.7)
EOSINOPHIL # BLD: 0 K/UL (ref 0–0.7)
EOSINOPHIL NFR BLD: 0 %
EOSINOPHIL NFR BLD: 0 %
ERYTHROCYTE [DISTWIDTH] IN BLOOD BY AUTOMATED COUNT: 18.8 % (ref 11–15)
ERYTHROCYTE [DISTWIDTH] IN BLOOD BY AUTOMATED COUNT: 18.9 % (ref 11–15)
ETHANOL SERPL-MCNC: 2 MG/DL
FOLATE SERPL-MCNC: 12.5 NG/ML
GLUCOSE BLDC GLUCOMTR-MCNC: 108 MG/DL (ref 70–99)
GLUCOSE SERPL-MCNC: 113 MG/DL (ref 70–99)
HAV IGM SERPL QL IA: NONREACTIVE
HBV CORE IGM SERPL QL IA: NONREACTIVE
HBV SURFACE AG SERPL QL IA: NONREACTIVE
HCT VFR BLD AUTO: 34.3 % (ref 41–52)
HCT VFR BLD AUTO: 34.7 % (ref 41–52)
HCV AB SERPL QL IA: NONREACTIVE
HGB BLD-MCNC: 11.2 G/DL (ref 13.5–17.5)
HGB BLD-MCNC: 11.3 G/DL (ref 13.5–17.5)
LYMPHOCYTES # BLD: 0.3 K/UL (ref 1–4)
LYMPHOCYTES # BLD: 0.5 K/UL (ref 1–4)
LYMPHOCYTES NFR BLD: 17 %
LYMPHOCYTES NFR BLD: 29 %
MAGNESIUM SERPL-MCNC: 1.9 MG/DL (ref 1.8–2.5)
MCH RBC QN AUTO: 26.1 PG (ref 27–32)
MCH RBC QN AUTO: 26.2 PG (ref 27–32)
MCHC RBC AUTO-ENTMCNC: 32.3 G/DL (ref 32–37)
MCHC RBC AUTO-ENTMCNC: 33 G/DL (ref 32–37)
MCV RBC AUTO: 79.6 FL (ref 80–100)
MCV RBC AUTO: 80.7 FL (ref 80–100)
MONOCYTES # BLD: 0.2 K/UL (ref 0–1)
MONOCYTES # BLD: 0.2 K/UL (ref 0–1)
MONOCYTES NFR BLD: 12 %
MONOCYTES NFR BLD: 9 %
MRSA DNA SPEC QL NAA+PROBE: NEGATIVE
NEUTROPHILS # BLD AUTO: 0.9 K/UL (ref 1.8–7.7)
NEUTROPHILS # BLD AUTO: 1.5 K/UL (ref 1.8–7.7)
NEUTROPHILS NFR BLD: 58 %
NEUTROPHILS NFR BLD: 73 %
OSMOLALITY UR CALC.SUM OF ELEC: 273 MOSM/KG (ref 275–295)
PLATELET # BLD AUTO: 19 K/UL (ref 140–400)
PLATELET # BLD AUTO: 9 K/UL (ref 140–400)
PMV BLD AUTO: 10 FL (ref 7.4–10.3)
PMV BLD AUTO: 8.6 FL (ref 7.4–10.3)
POTASSIUM SERPL-SCNC: 3.8 MMOL/L (ref 3.3–5.1)
PROT SERPL-MCNC: 8.1 G/DL (ref 5.9–8.4)
RBC # BLD AUTO: 4.3 M/UL (ref 4.5–5.9)
RBC # BLD AUTO: 4.31 M/UL (ref 4.5–5.9)
SODIUM SERPL-SCNC: 133 MMOL/L (ref 136–144)
VIT B12 SERPL-MCNC: 785 PG/ML (ref 181–914)
WBC # BLD AUTO: 1.6 K/UL (ref 4–11)
WBC # BLD AUTO: 2 K/UL (ref 4–11)

## 2018-01-30 PROCEDURE — 99223 1ST HOSP IP/OBS HIGH 75: CPT | Performed by: OTHER

## 2018-01-30 PROCEDURE — 0T9B70Z DRAINAGE OF BLADDER WITH DRAINAGE DEVICE, VIA NATURAL OR ARTIFICIAL OPENING: ICD-10-PCS | Performed by: HOSPITALIST

## 2018-01-30 PROCEDURE — 30233R1 TRANSFUSION OF NONAUTOLOGOUS PLATELETS INTO PERIPHERAL VEIN, PERCUTANEOUS APPROACH: ICD-10-PCS | Performed by: HOSPITALIST

## 2018-01-30 PROCEDURE — 71045 X-RAY EXAM CHEST 1 VIEW: CPT | Performed by: EMERGENCY MEDICINE

## 2018-01-30 PROCEDURE — 76700 US EXAM ABDOM COMPLETE: CPT | Performed by: NURSE PRACTITIONER

## 2018-01-30 PROCEDURE — 70450 CT HEAD/BRAIN W/O DYE: CPT | Performed by: EMERGENCY MEDICINE

## 2018-01-30 PROCEDURE — 70360 X-RAY EXAM OF NECK: CPT | Performed by: EMERGENCY MEDICINE

## 2018-01-30 RX ORDER — LORAZEPAM 2 MG/ML
1 INJECTION INTRAMUSCULAR
Status: DISCONTINUED | OUTPATIENT
Start: 2018-01-30 | End: 2018-02-03

## 2018-01-30 RX ORDER — POTASSIUM CHLORIDE 20 MEQ/1
40 TABLET, EXTENDED RELEASE ORAL EVERY 4 HOURS
Status: DISPENSED | OUTPATIENT
Start: 2018-01-30 | End: 2018-01-30

## 2018-01-30 RX ORDER — SODIUM CHLORIDE 0.9 % (FLUSH) 0.9 %
10 SYRINGE (ML) INJECTION AS NEEDED
Status: DISCONTINUED | OUTPATIENT
Start: 2018-01-30 | End: 2018-02-08

## 2018-01-30 RX ORDER — SODIUM CHLORIDE 9 MG/ML
INJECTION, SOLUTION INTRAVENOUS ONCE
Status: COMPLETED | OUTPATIENT
Start: 2018-01-30 | End: 2018-01-30

## 2018-01-30 RX ORDER — LORAZEPAM 2 MG/ML
INJECTION INTRAMUSCULAR
Status: COMPLETED
Start: 2018-01-30 | End: 2018-01-30

## 2018-01-30 RX ORDER — LORAZEPAM 2 MG/ML
2 INJECTION INTRAMUSCULAR EVERY 30 MIN PRN
Status: DISCONTINUED | OUTPATIENT
Start: 2018-01-30 | End: 2018-02-03

## 2018-01-30 RX ORDER — LORAZEPAM 2 MG/ML
4 INJECTION INTRAMUSCULAR
Status: DISCONTINUED | OUTPATIENT
Start: 2018-01-30 | End: 2018-02-03

## 2018-01-30 RX ORDER — FOLIC ACID 1 MG/1
1 TABLET ORAL DAILY
Status: DISCONTINUED | OUTPATIENT
Start: 2018-01-30 | End: 2018-01-30

## 2018-01-30 RX ORDER — 0.9 % SODIUM CHLORIDE 0.9 %
VIAL (ML) INJECTION
Status: COMPLETED
Start: 2018-01-30 | End: 2018-01-30

## 2018-01-30 RX ORDER — MELATONIN
100 DAILY
Status: DISCONTINUED | OUTPATIENT
Start: 2018-01-30 | End: 2018-01-30

## 2018-01-30 RX ORDER — MAGNESIUM OXIDE 400 MG (241.3 MG MAGNESIUM) TABLET
800 TABLET ONCE
Status: DISCONTINUED | OUTPATIENT
Start: 2018-01-30 | End: 2018-02-02

## 2018-01-30 RX ORDER — LORAZEPAM 2 MG/ML
3 INJECTION INTRAMUSCULAR EVERY 30 MIN PRN
Status: DISCONTINUED | OUTPATIENT
Start: 2018-01-30 | End: 2018-02-03

## 2018-01-30 RX ORDER — MULTIPLE VITAMINS W/ MINERALS TAB 9MG-400MCG
1 TAB ORAL DAILY
Status: DISCONTINUED | OUTPATIENT
Start: 2018-01-30 | End: 2018-01-30

## 2018-01-30 NOTE — SIGNIFICANT EVENT
RRT    *See RRT Documentation Record*    Reason the RRT was called: Seizure  Assessment of patient leading up to RRT: Alert and oriented, O2 sat 97 on room air,. HR 76, /91, temp 98.4. Bruising to right hip, mild petechiae.   Interventions/Testing: Ct

## 2018-01-30 NOTE — SIGNIFICANT EVENT
Code blue called for seizure    Gen tonic cloniic seizure activity 2 episodes first about 1.5 minutes then shortly after another lasting about 2 minutes. + tongue laceration. Known thrombocytopenia and hematoma to the r side of jaw and neck.   Was admitte

## 2018-01-30 NOTE — ED NOTES
Spoke with lab, was notified of critical platelets of 9    Differential for path review:  Neutrophils 57.5  Lymphocytes 29  Monocytes 12  Eosinophils 0.3  Basophils 1.0

## 2018-01-30 NOTE — CONSULTS
[facility] HOSP - [facility] [place]    Report of Consultation    Ruby Conteh Patient Status:  Inpatient    1970 MRN [id number]   Location [facility] 2W/SW Attending Josette Lee MD   Hosp Day # 1 PCP Yaakov Mart MD     Date of Admission: infusion 0.2-1.5 mcg/kg/hr Intravenous Continuous PRN   LORazepam (ATIVAN) injection 1 mg 1 mg Intravenous Q1H PRN   LORazepam (ATIVAN) injection 2 mg 2 mg Intravenous Q30 Min PRN   LORazepam (ATIVAN) injection 3 mg 3 mg Intravenous Q30 Min PRN   LORazepam bruising  ENDOCRINE: no temperature intolerance    Physical Exam:   Blood pressure 148/96, pulse 79, temperature 100 °F (37.8 °C), temperature source Temporal, resp. rate 15, height 5' 11\" (1.803 m), weight 169 lb 1.6 oz (76.7 kg), SpO2 99 %.   GENERAL: we with attention to the right side. However, soft tissue abnormality cannot be adequately assessed on this study. Please see above. Xr Soft Tissue Neck (cpt=70360)    Result Date: 1/30/2018  PROCEDURE: XR SOFT TISSUE NECK (CPT=70360)  COMPARISON: None.   I hemorrhage, mass, or acute infarction is seen. There is cerebellar folial expansion consistent with atrophy. BRAINSTEM: No edema, hemorrhage, mass, or acute infarction is seen. There is commensurate atrophy.   CALVARIUM: There is no apparent depressed frac major discrepancies. Impression:   Mr Lorena Child is a 42 y/o male that presented with bruising, noted to have pancytopenia with significant TCP (9). Patient with personal hx of blindness and etoh abuse.  He had a seizure overnight thought to be 2/2 wi

## 2018-01-30 NOTE — CONSULTS
Neurology Inpatient Consult Note    Chan Saleem : 1970   Referring Physician: Joseluis Eason NP  HPI:     Chan Saleem is a 41year old male who is being seen in neurologic evaluation. Patient is being seen in evaluation for seizure.   Sister is a History Main Topics    Smoking status: Current Every Day Smoker                                                     Packs/day: 0.00      Years: 20.00          Types: Cigars    Smokeless tobacco: Current User                         Types: Chew    Alcohol u 2016  CONCLUSION:   1. Nonspecific high density within the left vertebral artery and basilar artery. This may reflect anemia, thrombus, or artifact related imaging technique. Correlate for vertebrobasilar symptoms. Recommend followup CTA or MRA.   2. Ag

## 2018-01-30 NOTE — PLAN OF CARE
Impaired Swallowing    • Minimize aspiration risk Not Progressing        MUSCULOSKELETAL - ADULT    • Return mobility to safest level of function Not Progressing        NEUROLOGICAL - ADULT    • Achieves maximal functionality and self care Not Progressing

## 2018-01-30 NOTE — ED PROVIDER NOTES
Patient Seen in: [facility] AND [facility] Emergency Department    History   Patient presents with:  Abnormal Result (metabolic, cardiac)    Stated Complaint: low platelets, bruising     HPI    42 yo M with PMH blindness, HTN, EtOH abuse presenting for evaluation for rash. No itching. (+) bruising. Positive for stated complaint: low platelets, bruising  Other systems are as noted in HPI. Constitutional and vital signs reviewed. All other systems reviewed and negative except as noted above.     PSFH element W/ DIFFERENTIAL - Abnormal; Notable for the following:     WBC 1.6 (*)     RBC 4.31 (*)     HGB 11.3 (*)     HCT 34.3 (*)     MCV 79.6 (*)     MCH 26.2 (*)     RDW 18.9 (*)     PLT 9 (*)     All other components within normal limits   PROTHROMBIN TIME (PT) noted without fluctuance/induration, prior radiography noted and nonacute without associated tenderness.  CBC noted including downtrending hemoglobin/platelets and general thrombocytopenia - given prior epistaxis and guiaic positivity, PLT transfusion initi

## 2018-01-30 NOTE — PROGRESS NOTES
Pt was alert and oriented x4 with vss stable prior to the seizures that occurred after 0130. Admitted with purple bruising to the right hip, a scabbed skin tear to the right forearm, and skin color was pink and warm to touch.  Pt had small areas of petechia

## 2018-01-30 NOTE — SLP NOTE
ADULT SWALLOWING EVALUATION    ASSESSMENT    ASSESSMENT/OVERALL IMPRESSION:      Pt assessed sitting upright in bed. Pt with (L) facial bruise, and per sister report,  prior to admission.  Pt with seizure activity on 1/29/18 with resultant Pt biting tongue Dysphagia therapy  Discharge Recommendations/Plan: Undetermined    HISTORY   MEDICAL HISTORY  Reason for Referral:  (tongue hematoma, throat soreness)    Problem List  Principal Problem:     Thrombocytopenia (HCC)  Active Problems:    Hyponatremia    [place] be evaluated clinically.  Videofluoroscopic Swallow Study is required to rule-out silent aspiration.)    GOALS  Goal #1 The patient will tolerate clear thin liquids consistency without overt signs or symptoms of aspiration with 100 % accuracy over 3 session

## 2018-01-30 NOTE — CONSULTS
Hematology/Oncology Consult Note        NAME: Celena Armendariz - ROOM: 217/217-A - MRN: [id number] - Age: 41year old - : 1970    Reason for Consult:  Thrombocytopenia     Patient is a 41 y.o male with pmhx of blindness, etoh abuse who presents on this abdominal pain, diarrhea, constipation  : denies dysuria or changes in stream, hematuria   MSK: denies back pain  Neuro: denies headaches, no strokes or seizure history  Psyche: denies depression or anxiety  Hematologic: per HPI  Endocrine: denies thyroi from withdrawal. - possible underlying MDS given decrease in other cell lines.      Gi bleed  - stool occult positive, however no overt hematochezia after platelet transfusion  - await GI recs     Thank you for allowing me to participate in the care of this

## 2018-01-30 NOTE — H&P
[facility] Hospitalist Team  History and Physical     ASSESSMENT / PLAN:   40 yo male with hx blindness, HTN, ETOH abuse with hx of withdrawal ass with sz and DT , chronic thrombocytopenia, chronic front HL-not on meds who presents with bruising.  Pt found to be p Petty Gaines NP  [facility] Hospitalist Team  Pager [phone]  Answering Service number: [phone]  1/30/2018      HISTORY:   CC: Patient presents with:  Abnormal Result (metabolic, cardiac)       PCP: Vilma Musa MD    History of Present Illness: 10/11/2017   • Visual impairment         [facility]  Past Surgical History:  June 24 1974, July 1974: EYE SURGERY     ALL:    Phenergan [Prometha*         Home Medications:    Outpatient Prescriptions Marked as Taking for the 1/29/18 encounter [facility] Encounter (cpt=70360)    Result Date: 1/30/2018  CONCLUSION:  1. Atherosclerosis otherwise unremarkable examination. 2. A preliminary report was submitted and there is agreement without major discrepancies.         Ct Brain Or Head (70539)    Result Date: 1/30/2018 memory issues. No family hx of leukemia/lymphoma or other bone marrow disorders. Mother had breast cancer that spread to bone.      Objective  /78   Pulse 90   Temp 100 °F (37.8 °C) (Temporal)   Resp 16   Ht 180.3 cm (5' 11\")   Wt 169 lb 1.6 oz (76.7 swish  -speech eval, recommend liquids for now    Anemia/OB + stools  -hgb stable at 11's  -OB stool + in ER  -protonix  -GI consult, no endoscopy at this time given thrombocytopenia and no history of GI bleeding  - likely with small occult bleed, worsened

## 2018-01-31 LAB
ALBUMIN SERPL BCP-MCNC: 3.7 G/DL (ref 3.5–4.8)
ALBUMIN/GLOB SERPL: 0.9 {RATIO} (ref 1–2)
ALP SERPL-CCNC: 82 U/L (ref 32–100)
ALT SERPL-CCNC: 51 U/L (ref 17–63)
ANION GAP SERPL CALC-SCNC: 10 MMOL/L (ref 0–18)
AST SERPL-CCNC: 112 U/L (ref 15–41)
BASOPHILS # BLD: 0 K/UL (ref 0–0.2)
BASOPHILS NFR BLD: 0 %
BILIRUB SERPL-MCNC: 2.9 MG/DL (ref 0.3–1.2)
BLOOD TYPE BARCODE: 5100
BLOOD TYPE BARCODE: 5100
BUN SERPL-MCNC: 6 MG/DL (ref 8–20)
BUN/CREAT SERPL: 11.1 (ref 10–20)
CALCIUM SERPL-MCNC: 9.2 MG/DL (ref 8.5–10.5)
CHLORIDE SERPL-SCNC: 98 MMOL/L (ref 95–110)
CO2 SERPL-SCNC: 25 MMOL/L (ref 22–32)
CREAT SERPL-MCNC: 0.54 MG/DL (ref 0.5–1.5)
EOSINOPHIL # BLD: 0 K/UL (ref 0–0.7)
EOSINOPHIL NFR BLD: 1 %
ERYTHROCYTE [DISTWIDTH] IN BLOOD BY AUTOMATED COUNT: 18.4 % (ref 11–15)
GLOBULIN PLAS-MCNC: 4 G/DL (ref 2.5–3.7)
GLUCOSE SERPL-MCNC: 101 MG/DL (ref 70–99)
HCT VFR BLD AUTO: 32.3 % (ref 41–52)
HGB BLD-MCNC: 10.6 G/DL (ref 13.5–17.5)
LYMPHOCYTES # BLD: 0.7 K/UL (ref 1–4)
LYMPHOCYTES NFR BLD: 22 %
MCH RBC QN AUTO: 26.8 PG (ref 27–32)
MCHC RBC AUTO-ENTMCNC: 32.9 G/DL (ref 32–37)
MCV RBC AUTO: 81.5 FL (ref 80–100)
MONOCYTES # BLD: 0.3 K/UL (ref 0–1)
MONOCYTES NFR BLD: 10 %
NEUTROPHILS # BLD AUTO: 2.1 K/UL (ref 1.8–7.7)
NEUTROPHILS NFR BLD: 67 %
OSMOLALITY UR CALC.SUM OF ELEC: 274 MOSM/KG (ref 275–295)
PLATELET # BLD AUTO: 28 K/UL (ref 140–400)
PMV BLD AUTO: 8.7 FL (ref 7.4–10.3)
POTASSIUM SERPL-SCNC: 3 MMOL/L (ref 3.3–5.1)
POTASSIUM SERPL-SCNC: 3.2 MMOL/L (ref 3.3–5.1)
PROT SERPL-MCNC: 7.7 G/DL (ref 5.9–8.4)
RBC # BLD AUTO: 3.97 M/UL (ref 4.5–5.9)
SODIUM SERPL-SCNC: 133 MMOL/L (ref 136–144)
WBC # BLD AUTO: 3.1 K/UL (ref 4–11)

## 2018-01-31 PROCEDURE — 99231 SBSQ HOSP IP/OBS SF/LOW 25: CPT | Performed by: OTHER

## 2018-01-31 RX ORDER — SODIUM CHLORIDE 0.9 % (FLUSH) 0.9 %
10 SYRINGE (ML) INJECTION AS NEEDED
Status: DISCONTINUED | OUTPATIENT
Start: 2018-01-31 | End: 2018-02-08

## 2018-01-31 RX ORDER — SODIUM CHLORIDE 9 MG/ML
INJECTION, SOLUTION INTRAVENOUS ONCE
Status: COMPLETED | OUTPATIENT
Start: 2018-01-31 | End: 2018-01-31

## 2018-01-31 RX ORDER — SODIUM CHLORIDE 9 MG/ML
INJECTION, SOLUTION INTRAVENOUS
Status: COMPLETED
Start: 2018-01-31 | End: 2018-01-31

## 2018-01-31 NOTE — PLAN OF CARE
HEMATOLOGIC - ADULT    • Maintains hematologic stability Progressing    • Free from bleeding injury Progressing        Impaired Swallowing    • Minimize aspiration risk Progressing        Patient Centered Care    • Patient preferences are identified and in

## 2018-01-31 NOTE — PROGRESS NOTES
[facility] AND [facility]  Gastroenterology Progress Note    Arthur Oliveros Patient Status:  Inpatient    1970 MRN [id number]   Location [facility] 2W/SW Attending Ivana Vila MD   Hosp Day # 2 PCP Jeanette Blanac MD     Subjective:  Arthur Oliveros is a 1. Normal examination. No significant change has occurred from January 3, 2017. 2. A preliminary report was submitted and there is agreement without major discrepancies.            Problem list:  Patient Active Problem List:     Alcohol withdrawal seizure (

## 2018-01-31 NOTE — CM/SW NOTE
AUGIE received for etoh resources. Pt currently demonstrating withdrawal symptoms while in critical care. SW will assist as indicated following pt's progress.     AMAURY retana [id number]

## 2018-01-31 NOTE — PROGRESS NOTES
Critical Care Consult     Assessment / Plan:  1. Etoh withdrawal  - wean precedex  - benzos prn  - mvi, thiamine, folic acid  - ciwa  2. Seizure - due to above  - per neuro  3. TCP - likely d/t etoh use  - per heme  4.  FEN  - diet as mental status allows daily. Disp: 90 tablet Rfl: 3        Phenergan [Prometha*         Social History  Social History   Marital status: Single  Spouse name: N/A    Years of education: N/A  Number of children: N/A     Occupational History  None on file     Social History [employer]

## 2018-01-31 NOTE — PROGRESS NOTES
Neurology Inpatient Follow-up Note      HPI:     Patient is being seen in follow-up. Drowsy today.       Past Medical Hisotory:  Reviewed    Medications:  Reviewed    Allergies:    Phenergan [Prometha*          ROS:   Unable to obtain from patient      Ren Henderson

## 2018-01-31 NOTE — PROGRESS NOTES
Pt AO2, CIWA score upon start of shift at 14 and above. Agitation/restlessness noted with diaphoresis and slight tremors. Took out and gave 3mg ativan IV push around 2000 1/30, with minimal relief. Started precedex drip at 0.2mcg/kg/hr.   Reassessed poli

## 2018-01-31 NOTE — PLAN OF CARE
HEMATOLOGIC - ADULT    • Maintains hematologic stability Progressing    • Free from bleeding injury Progressing        Impaired Swallowing    • Minimize aspiration risk Progressing        MUSCULOSKELETAL - ADULT    • Return mobility to safest level of func

## 2018-01-31 NOTE — PROGRESS NOTES
[facility] Hospitalist Team  Progress Note    Dontae Huizar Patient Status:  Inpatient    1970 MRN [id number]   Location [facility] 2W/SW Attending Josep Ramsey MD   Hosp Day # 2 PCP Krys Sen MD     CC: Follow Up  PCP: Krys Sen MD    A bili 2.6, direct bili 1   -hepatitis panel negative  -US abdomen- mild hepatosplenomegaly with hepatic steatosis  -plans for EGD/Colon pending improved platelet count and no further sz  -appreciate GI input     Tongue Trauma 2/2 Sz  -noted large tongue, no 01/31/18 0426   WBC  1.6*  2.0*  3.1*   HGB  11.3*  11.2*  10.6*   MCV  79.6*  80.7  81.5   PLT  9*  19*  28*   INR  1.1   --    --        Recent Labs   Lab  01/29/18   1739  01/30/18   0433  01/31/18   0426  01/31/18   0740   NA  134*  133*  133*   -- (DULCOLAX) rectal suppository 10 mg 10 mg Rectal Daily PRN   FLEET ENEMA (FLEET) 7-19 GM/118ML enema 133 mL 1 enema Rectal Once PRN   ondansetron HCl (ZOFRAN) injection 4 mg 4 mg Intravenous Q6H PRN   Pantoprazole Sodium (PROTONIX) 40 mg in Sodium Chloride no rashes or lesions, well perfused  Psych: mood stable, cooperative  Neuro: no focal deficits    Assessment and Plan    Acute on Chronic Thrombocytopenia  -Epic labs reviewed highest platelet count 242, noted previous admission at [place] [address] in 12/24/16 to drip  -CIWA  -banana bag  -appreciate CC MD   -SUSANNA consult      Rest as above with above

## 2018-01-31 NOTE — PROGRESS NOTES
Hematology/Oncology  Progress Note        NAME: Sivan Alves - ROOM: 217/217-A - MRN: [id number] - Age: 41year old - : 1970    Subjective:  No acute events overnight. Had ng placed last night and with blood tinged urine.      Medications:  • pot lesions.     Recent Labs   Lab  01/31/18   0426   RBC  3.97*   HGB  10.6*   HCT  32.3*   MCV  81.5   MCH  26.8*   MCHC  32.9   RDW  18.4*   WBC  3.1*   PLT  28*     Recent Labs   Lab  01/29/18   1739  01/30/18   0433  01/31/18   0426  01/31/18   0740   [facility] Oncology

## 2018-02-01 ENCOUNTER — APPOINTMENT (OUTPATIENT)
Dept: GENERAL RADIOLOGY | Facility: HOSPITAL | Age: 48
DRG: 813 | End: 2018-02-01
Attending: NURSE PRACTITIONER
Payer: MEDICARE

## 2018-02-01 LAB
ALBUMIN SERPL BCP-MCNC: 3.6 G/DL (ref 3.5–4.8)
ALP SERPL-CCNC: 84 U/L (ref 32–100)
ALT SERPL-CCNC: 44 U/L (ref 17–63)
ANION GAP SERPL CALC-SCNC: 8 MMOL/L (ref 0–18)
AST SERPL-CCNC: 81 U/L (ref 15–41)
BASOPHILS # BLD: 0 K/UL (ref 0–0.2)
BASOPHILS NFR BLD: 0 %
BILIRUB DIRECT SERPL-MCNC: 0.9 MG/DL (ref 0–0.2)
BILIRUB SERPL-MCNC: 2.6 MG/DL (ref 0.3–1.2)
BILIRUB UR QL: POSITIVE
BLOOD TYPE BARCODE: 5100
BUN SERPL-MCNC: 9 MG/DL (ref 8–20)
BUN/CREAT SERPL: 16.1 (ref 10–20)
CALCIUM SERPL-MCNC: 9.3 MG/DL (ref 8.5–10.5)
CHLORIDE SERPL-SCNC: 103 MMOL/L (ref 95–110)
CO2 SERPL-SCNC: 22 MMOL/L (ref 22–32)
CREAT SERPL-MCNC: 0.56 MG/DL (ref 0.5–1.5)
EOSINOPHIL # BLD: 0 K/UL (ref 0–0.7)
EOSINOPHIL NFR BLD: 0 %
ERYTHROCYTE [DISTWIDTH] IN BLOOD BY AUTOMATED COUNT: 19.1 % (ref 11–15)
GLUCOSE SERPL-MCNC: 122 MG/DL (ref 70–99)
GLUCOSE UR-MCNC: NEGATIVE MG/DL
HCT VFR BLD AUTO: 35.5 % (ref 41–52)
HGB BLD-MCNC: 11.5 G/DL (ref 13.5–17.5)
LYMPHOCYTES # BLD: 0.4 K/UL (ref 1–4)
LYMPHOCYTES NFR BLD: 11 %
MAGNESIUM SERPL-MCNC: 1.4 MG/DL (ref 1.8–2.5)
MCH RBC QN AUTO: 26.7 PG (ref 27–32)
MCHC RBC AUTO-ENTMCNC: 32.5 G/DL (ref 32–37)
MCV RBC AUTO: 82.1 FL (ref 80–100)
MONOCYTES # BLD: 0.4 K/UL (ref 0–1)
MONOCYTES NFR BLD: 13 %
NEUTROPHILS # BLD AUTO: 2.6 K/UL (ref 1.8–7.7)
NEUTROPHILS NFR BLD: 76 %
NITRITE UR QL STRIP.AUTO: POSITIVE
OSMOLALITY UR CALC.SUM OF ELEC: 276 MOSM/KG (ref 275–295)
PH UR: 7 [PH] (ref 5–8)
PLATELET # BLD AUTO: 37 K/UL (ref 140–400)
PMV BLD AUTO: 8.7 FL (ref 7.4–10.3)
POTASSIUM SERPL-SCNC: 3.9 MMOL/L (ref 3.3–5.1)
PROT SERPL-MCNC: 8 G/DL (ref 5.9–8.4)
PROT UR-MCNC: 100 MG/DL
RBC # BLD AUTO: 4.32 M/UL (ref 4.5–5.9)
RBC #/AREA URNS AUTO: 506 /HPF
SODIUM SERPL-SCNC: 133 MMOL/L (ref 136–144)
SP GR UR STRIP: 1.02 (ref 1–1.03)
UROBILINOGEN UR STRIP-ACNC: 4
VIT C UR-MCNC: NEGATIVE MG/DL
WBC # BLD AUTO: 3.3 K/UL (ref 4–11)
WBC #/AREA URNS AUTO: 202 /HPF

## 2018-02-01 PROCEDURE — 71045 X-RAY EXAM CHEST 1 VIEW: CPT | Performed by: NURSE PRACTITIONER

## 2018-02-01 RX ORDER — FOLIC ACID 5 MG/ML
1 INJECTION, SOLUTION INTRAMUSCULAR; INTRAVENOUS; SUBCUTANEOUS DAILY
Status: DISCONTINUED | OUTPATIENT
Start: 2018-02-01 | End: 2018-02-06

## 2018-02-01 RX ORDER — FOLIC ACID 5 MG/ML
1 INJECTION, SOLUTION INTRAMUSCULAR; INTRAVENOUS; SUBCUTANEOUS DAILY
Status: DISCONTINUED | OUTPATIENT
Start: 2018-02-01 | End: 2018-02-01

## 2018-02-01 RX ORDER — FOLIC ACID 1 MG/1
1 TABLET ORAL EVERY 24 HOURS
Status: DISCONTINUED | OUTPATIENT
Start: 2018-02-01 | End: 2018-02-01

## 2018-02-01 RX ORDER — DOXEPIN HYDROCHLORIDE 50 MG/1
1 CAPSULE ORAL DAILY
Status: DISCONTINUED | OUTPATIENT
Start: 2018-02-01 | End: 2018-02-01

## 2018-02-01 RX ORDER — FOLIC ACID 1 MG/1
1 TABLET ORAL DAILY
Status: DISCONTINUED | OUTPATIENT
Start: 2018-02-01 | End: 2018-02-01

## 2018-02-01 RX ORDER — MELATONIN
100 DAILY
Status: DISCONTINUED | OUTPATIENT
Start: 2018-02-01 | End: 2018-02-01

## 2018-02-01 RX ORDER — DEXTROSE AND SODIUM CHLORIDE 5; .45 G/100ML; G/100ML
INJECTION, SOLUTION INTRAVENOUS CONTINUOUS
Status: DISCONTINUED | OUTPATIENT
Start: 2018-02-01 | End: 2018-02-06

## 2018-02-01 NOTE — PROGRESS NOTES
Hematology/Oncology  Progress Note        NAME: Ruby Conteh - ROOM: 217217-A - MRN: [id number] - Age: 41year old - : 1970    Subjective:  No acute events overnight.      Medications:  • magnesium sulfate  4 g Intravenous Once   • Thiamine HCl  10 extremities     Recent Labs   Lab  02/01/18   0514   RBC  4.32*   HGB  11.5*   HCT  35.5*   MCV  82.1   MCH  26.7*   MCHC  32.5   RDW  19.1*   WBC  3.3*   PLT  37*     Recent Labs   Lab  01/30/18   0433  01/31/18   0426  01/31/18   0740  02/01/18   [id number] participate in the care of this patient, please call with any questions.     Kasey Salas M.D.  [facility] Hematology and Oncology

## 2018-02-01 NOTE — SLP NOTE
Per RN, pt reportedly coughing on thin liquid diet intake 1/31. Pt is now NPO and not appropriate or alert enough for PO trials. Will f/u 2/2 for trial upgrade as patient is appropriate to participate.    Thank you,  Lorrie Treadwell MA, CCC-SLP  Speech-L

## 2018-02-01 NOTE — PROGRESS NOTES
[facility]  Gastroenterology Progress Note    Gareth Jean-Baptiste Patient Status:  Inpatient    1970 MRN [id number]   Location [facility] 2W/SW Attending Odilia Medina MD   Hosp Day # 3 PCP Abbey Whitfield MD     Subjective:  Gareth Jean-Baptiste is a significant TCP (9). Patient with personal hx of blindness and etoh abuse. Hospital course c/b etoh withdrawal and seizure. GI consulted for anemia/occult blood in stools and elevated LFT's     1.  Anemia/occult blood in stools  2 point decline over the pas

## 2018-02-01 NOTE — CM/SW NOTE
SW met with the pt's sister, per her request. Pt asleep, rambling speech, but moving within the bed. Sister Dejuan Owens states that she and their other sister Casandra Aguirre have been assisting with the pt's needs for several years.  Pt is an alcoholic and has been hospit

## 2018-02-01 NOTE — CONSULTS
Sandy Araujo Patient Status:  Inpatient    1970 MRN [id number]   Location [facility] 2W/SW Attending Mich Mariano MD   Hosp Day # 3 PCP Za Ortiz MD     Critical Care Progress Note    Assessment/Plan:  1.  Etoh withdrawal  - wean prece 01/30/18   0433  01/31/18   0426  01/31/18   0740  02/01/18   0514   GLU  113*  101*   --   122*   BUN  3*  6*   --   9   CREATSERUM  0.53  0.54   --   0.56   GFRAA  >60  >60   --   >60   GFRNAA  >60  >60   --   >60   CA  9.2  9.2   --   9.3   ALB  3.9  3.

## 2018-02-01 NOTE — PROGRESS NOTES
[facility] Hospitalist Team  Progress Note    [facility] Patient Status:  Inpatient    1970 MRN [id number]   Location [facility] 2W/SW Attending Heather Carlos MD   Hosp Day # 3 PCP Luisa Oneal MD     CC: Follow Up  PCP: Luisa Oneal MD    A neurology, provoked sz, due to chronic alcohol abuse and withdrawal, no antiepileptic meds needed  -appreciate neurology input    Hematuria  -due to ng insertion with low platelets  -UA  -follow     Elevated LFT's  -likely due to ETOH  -improving since 19*  28*  37*   INR  1.1   --    --    --        Recent Labs   Lab  01/29/18   1739  01/30/18   0433  01/31/18   0426  01/31/18   0740  02/01/18   0514   NA  134*  133*  133*   --   133*   K  3.2*  3.8  3.0*  3.2*  3.9   CL  96  96  98   --   103   CO2  22 (DULCOLAX) rectal suppository 10 mg 10 mg Rectal Daily PRN   FLEET ENEMA (FLEET) 7-19 GM/118ML enema 133 mL 1 enema Rectal Once PRN   ondansetron HCl (ZOFRAN) injection 4 mg 4 mg Intravenous Q6H PRN   Pantoprazole Sodium (PROTONIX) 40 mg in Sodium Chloride no cyanosis.    Skin: no rashes or lesions, well perfused  Psych: unable to asses, confused  Neuro: no focal deficits    Assessment and Plan    ETOH abuse with Withdrawal  -hx of withdrawal with DT and Sz  -ETOH level 2 on admission  -placed on precedex dri ER  -protonix  -GI consult     Hypokalemia/Hypomagnesemia  -replete via protocol      Rest as above with above

## 2018-02-02 ENCOUNTER — APPOINTMENT (OUTPATIENT)
Dept: CV DIAGNOSTICS | Facility: HOSPITAL | Age: 48
DRG: 813 | End: 2018-02-02
Attending: NURSE PRACTITIONER
Payer: MEDICARE

## 2018-02-02 LAB
ANION GAP SERPL CALC-SCNC: 8 MMOL/L (ref 0–18)
BASOPHILS # BLD: 0 K/UL (ref 0–0.2)
BASOPHILS NFR BLD: 0 %
BUN SERPL-MCNC: 8 MG/DL (ref 8–20)
BUN/CREAT SERPL: 14.5 (ref 10–20)
CALCIUM SERPL-MCNC: 8.8 MG/DL (ref 8.5–10.5)
CHLORIDE SERPL-SCNC: 104 MMOL/L (ref 95–110)
CO2 SERPL-SCNC: 21 MMOL/L (ref 22–32)
CREAT SERPL-MCNC: 0.55 MG/DL (ref 0.5–1.5)
EOSINOPHIL # BLD: 0 K/UL (ref 0–0.7)
EOSINOPHIL NFR BLD: 0 %
ERYTHROCYTE [DISTWIDTH] IN BLOOD BY AUTOMATED COUNT: 19.1 % (ref 11–15)
GLUCOSE SERPL-MCNC: 119 MG/DL (ref 70–99)
HCT VFR BLD AUTO: 34 % (ref 41–52)
HGB BLD-MCNC: 11 G/DL (ref 13.5–17.5)
LYMPHOCYTES # BLD: 0.5 K/UL (ref 1–4)
LYMPHOCYTES NFR BLD: 15 %
MAGNESIUM SERPL-MCNC: 1.8 MG/DL (ref 1.8–2.5)
MCH RBC QN AUTO: 26.6 PG (ref 27–32)
MCHC RBC AUTO-ENTMCNC: 32.3 G/DL (ref 32–37)
MCV RBC AUTO: 82.5 FL (ref 80–100)
MONOCYTES # BLD: 0.7 K/UL (ref 0–1)
MONOCYTES NFR BLD: 19 %
NEUTROPHILS # BLD AUTO: 2.4 K/UL (ref 1.8–7.7)
NEUTROPHILS NFR BLD: 66 %
OSMOLALITY UR CALC.SUM OF ELEC: 275 MOSM/KG (ref 275–295)
PLATELET # BLD AUTO: 35 K/UL (ref 140–400)
PMV BLD AUTO: 8.7 FL (ref 7.4–10.3)
POTASSIUM SERPL-SCNC: 3.6 MMOL/L (ref 3.3–5.1)
RBC # BLD AUTO: 4.12 M/UL (ref 4.5–5.9)
SODIUM SERPL-SCNC: 133 MMOL/L (ref 136–144)
WBC # BLD AUTO: 3.6 K/UL (ref 4–11)

## 2018-02-02 PROCEDURE — HZ2ZZZZ DETOXIFICATION SERVICES FOR SUBSTANCE ABUSE TREATMENT: ICD-10-PCS | Performed by: HOSPITALIST

## 2018-02-02 PROCEDURE — B246ZZZ ULTRASONOGRAPHY OF RIGHT AND LEFT HEART: ICD-10-PCS | Performed by: INTERNAL MEDICINE

## 2018-02-02 PROCEDURE — 90792 PSYCH DIAG EVAL W/MED SRVCS: CPT | Performed by: OTHER

## 2018-02-02 PROCEDURE — 93306 TTE W/DOPPLER COMPLETE: CPT | Performed by: NURSE PRACTITIONER

## 2018-02-02 RX ORDER — MAGNESIUM OXIDE 400 MG (241.3 MG MAGNESIUM) TABLET
400 TABLET ONCE
Status: DISCONTINUED | OUTPATIENT
Start: 2018-02-02 | End: 2018-02-02

## 2018-02-02 RX ORDER — MAGNESIUM SULFATE HEPTAHYDRATE 40 MG/ML
2 INJECTION, SOLUTION INTRAVENOUS ONCE
Status: COMPLETED | OUTPATIENT
Start: 2018-02-02 | End: 2018-02-02

## 2018-02-02 RX ORDER — POTASSIUM CHLORIDE 20 MEQ/1
40 TABLET, EXTENDED RELEASE ORAL EVERY 4 HOURS
Status: DISCONTINUED | OUTPATIENT
Start: 2018-02-02 | End: 2018-02-02

## 2018-02-02 NOTE — SLP NOTE
ADULT SWALLOWING EVALUATION    ASSESSMENT    ASSESSMENT/OVERALL IMPRESSION:  Pt seen for BSSE following decline in status and new orders for NPO. RN contacted this SLP when patient presented off sedative and more alert for PO trials.  Pt seen katie Arredondo stools    Seizure ([facility] [address]    Legally blind      Past Medical History  Past Medical History:   Diagnosis Date   • Back problem    • Blind    • ETOH abuse    • Glaucoma    • High blood pressure    • Non-24 hour sleep-wake rhythm 10/11/2017   • Retrolental fibr patient/family/caregiver will demonstrate understanding and implementation of aspiration precautions and swallow strategies independently over 3 session(s).     In Progress   Goal #3 The patient will tolerate trial upgrade of solid consistency and thin liqu

## 2018-02-02 NOTE — PROGRESS NOTES
Took over care of patient starting [phone]. Patient in bed, sedated but opens eyes with verbal stimuli. Very lethargic to answer orientation questions. V/s noted. Kept patient NPO. CIWA done, maintained on Precedex drip as ordered. IVF infusing as ordered.  NP

## 2018-02-02 NOTE — CONSULTS
Racheal Odonnell is a 41year old male.    Patient presents with:  Abnormal Result (metabolic, cardiac)      HPI:    Quite active coherent per sister when he is not drinking  siezure and etoh withdrawal at present  Mumbling not coherent at present    REVIEW OF origin  2. Current rx is ceftriaxone vanc and advise cpm  3. Workup in progress with echo pending; consider ct chest abd pelvis when a little more stable  4.  Spoke with sister at bedside; thanks, #[phone] thanks            Lab Results  Component Value Flynn infection (hepatitis C, EBV, other viruses), metabolic disorders (liver disease, endocrine disorders), collagen vascular diseases (SLE, etc) space occupying lesions of the bone marrow (myelodysplasia, acute leukemia, multiple myeloma, solid tumors, etc), p Bilirubin, Direct 1.0 (H) 0.0 - 0.2 mg/dL   -COMP METABOLIC PANEL (14)   Result Value Ref Range   Glucose 101 (H) 70 - 99 mg/dL   Sodium 133 (L) 136 - 144 mmol/L   Potassium 3.0 (L) 3.3 - 5.1 mmol/L   Chloride 98 95 - 110 mmol/L   CO2 25 22 - 32 mmol/L Leukocyte Esterase Urine Moderate (A) Negative   Ascorbic Acid Urine Negative Negative mg/dL   Squamous Epi.  Cells Few /HPF   WBC Urine 202 (H) 0 - 5 /HPF   RBC URINE 506 (H) 0 - 3 /HPF   Bacteria Urine Many (A) None Seen /HPF   -HEPATIC FUNCTION PANEL ( Result Value Ref Range   POC Glucose  108 (H) 70 - 99   -RAINBOW DRAW BLUE   Result Value Ref Range   Hold Blue Auto Resulted    -RAINBOW DRAW LAVENDER   Result Value Ref Range   Hold Lavender Auto Resulted    -RAINBOW DRAW DARK GREEN   Result Value Ref g/dl   RDW 18.8 (H) 11.0 - 15.0 %   PLT 19 (LL) 140 - 400 K/UL   MPV 8.6 7.4 - 10.3 fL   Neutrophil % 73 %   Lymphocyte % 17 %   Monocyte % 9 %   Eosinophil % 0 %   Basophil % 0 %   Neutrophil Absolute 1.5 (L) 1.8 - 7.7 K/UL   Lymphocyte Absolute 0.3 (L) 1 g/dL   HCT 34.0 (L) 41.0 - 52.0 %   MCV 82.5 80.0 - 100.0 fL   MCH 26.6 (L) 27.0 - 32.0 pg   MCHC 32.3 32.0 - 37.0 g/dl   RDW 19.1 (H) 11.0 - 15.0 %   PLT 35 (L) 140 - 400 K/UL   MPV 8.7 7.4 - 10.3 fL   Neutrophil % 66 %   Lymphocyte % 15 %   Monocyte % 19

## 2018-02-02 NOTE — PROGRESS NOTES
[facility] Hospitalist Team  Progress Note    Sarah Zhou Patient Status:  Inpatient    1970 MRN [id number]   Location [facility] 2W/SW Attending Dejuan Stacy MD   Hosp Day # 4 PCP Justice Fuller MD     CC: Follow Up  PCP: Justice Fuller MD    A withdrawal  -CT head negative for acute process  -per pt only  Having 2 drinks a day, ETOH level pending, CIWA protocol, will change to po meds once cleared by speech  -per neurology, provoked sz, due to chronic alcohol abuse and withdrawal, no antiepilept sided of face and right hip area  RESP: non labored, CTA  CARDIO: Regular, no murmur  ABD: soft, NT, ND, BS+  : ng without blood at present  EXTREMITIES: no edema, no calf tenderness, SCD    DIAGNOSTIC DATA:   Labs:     Recent Labs   Lab  01/31/18   0 PRN   LORazepam (ATIVAN) injection 3 mg 3 mg Intravenous Q30 Min PRN   LORazepam (ATIVAN) injection 4 mg 4 mg Intravenous Q15 Min PRN   Lidocaine Viscous (XYLOCAINE) 2 % mouth solution 5 mL 5 mL Mouth/Throat PRN   Normal Saline Flush 0.9 % injection 10 mL clear, normal respiratory effort, No wheezing or crackles  CV: Heart with regular rate and rhythm, No murmurs, rubs, gallops  Abd: Abdomen soft, nontender, nondistended, no organomegaly, bowel sounds present  MSK:  no clubbing, no cyanosis.   No Lower extre hematology  -MD Blood smear noted  -US abdomen-mild hepatosplenomegaly with hepatic steatosis  -per heme drop in platelet may be related to infection as well as chronic ETOH, holding off on bone marrow bx   -appreciate heme input      Seizure 2/2 ETOH with

## 2018-02-02 NOTE — PROGRESS NOTES
[facility]  Gastroenterology Progress Note    Johnnie Horne Patient Status:  Inpatient    1970 MRN [id number]   Location [facility] 2W/SW Attending Nirmal Mckeon MD   Hosp Day # 4 PCP Gordon Dozier MD     Subjective:  Irving Chen stool positive but no overt bleeding. Also with noted pancytopenia and very low platelets. No prior endoscopic workup     -Given anemia will require endoscopic evaluation. Will defer pending improvements in platelet count and resolution of withdrawals.   -H

## 2018-02-02 NOTE — PLAN OF CARE
Problem: SAFETY ADULT - FALL  Goal: Free from fall injury  INTERVENTIONS:  - Assess pt frequently for physical needs  - Identify cognitive and physical deficits and behaviors that affect risk of falls.   - [place] fall precautions as indicated by assessme

## 2018-02-02 NOTE — PROGRESS NOTES
Hematology/Oncology  Progress Note        NAME: Eveline Moore - ROOM: 217/217-A - MRN: [id number] - Age: 41year old - : 1970    Subjective:  Cultures positive, started on antibiotics overnight.    Urine with clots.   hgb holding     Medications:  • or lesions.  + bruising on jaw and extremities     Recent Labs   Lab  02/02/18   0437   RBC  4.12*   HGB  11.0*   HCT  34.0*   MCV  82.5   MCH  26.6*   MCHC  32.3   RDW  19.1*   WBC  3.6*   PLT  35*     Recent Labs   Lab  01/31/18   0426  01/31/18   0740  0 positive, however no overt hematochezia after platelet transfusion  - no other stools  - c/w ppi  - observation for now     Hematuria  - now worsening again  - transfuse platelets      Thank you for allowing me to participate in the care of this patient, p

## 2018-02-02 NOTE — PLAN OF CARE
Impaired Swallowing    • Minimize aspiration risk Not Progressing    Speech therapy on consult    NEUROLOGICAL - ADULT    • Achieves maximal functionality and self care Not Progressing    Requiring assistance for ADL    Safety Risk - Non-Violent Restraints

## 2018-02-02 NOTE — CONSULTS
[facility]    PATIENT'S NAME: Fermin Braxton   ATTENDING PHYSICIAN: Adry Gonzalez MD   CONSULTING PHYSICIAN: Amy Castañeda.  Jun Astorga MD   PATIENT ACCOUNT#:   [id number]    LOCATION:  [address] [place] [address] #:   [id number]       DATE OF BIRTH: on exam.  EXTREMITIES:  No endocarditis stigmata is noted. There are areas of ecchymosis and some small scabs, but no open lesions and no erythema appreciated by me. NEUROLOGIC:  Disoriented, mumbling, moving all extremities.     LABORATORY DATA:  Blood c

## 2018-02-02 NOTE — PROGRESS NOTES
120 [facility] dosing service    Initial Pharmacokinetic Consult for Vancomycin Dosing     Eveline White is a 41year old male admitted on 1/29 who is being treated for bacteremia.   Pharmacy has been asked to dose Vancomycin by Gwendolyn Abraham    He is allergic dose, followed by 1 gm Q 12 hours  based upon, actual weight, renal function, and pharmacokinetics. 2.  Pharmacy ordered Vancomycin trough level(s) prior to 4th dose ([email or web address]). Goal trough level 15-20 ug/mL.     3.  Pharmacy will need BUN/Scr daily wh

## 2018-02-02 NOTE — BH PROGRESS NOTE
Behavioral Health SOAP Note  SUBJECTIVE   Patient is a 42 y/o male admitted with a h/o etoh withdrawal.  Patient has a h/o prior admissions for etoh withdrawal seizures. SW met with the patient at bedside with Dr. Michael Browne.   The patient is blind, but was r

## 2018-02-02 NOTE — PROGRESS NOTES
Alfonso Johnson Patient Status:  Inpatient    1970 MRN [id number]   Location [facility] 2W/SW Attending Mendez Kerr MD   Hosp Day # 4 PCP Yudith Batista MD     Critical Care Progress Note      Assessment/Plan:  1.  Etoh withdrawal - more aler edema.   Skin: No rashes or lesions.     Recent Labs   Lab  02/02/18   0437   RBC  4.12*   HGB  11.0*   HCT  34.0*   MCV  82.5   MCH  26.6*   MCHC  32.3   RDW  19.1*   WBC  3.6*   PLT  35*     Recent Labs   Lab  01/31/18   0426  01/31/18   0740  02/01/18

## 2018-02-03 ENCOUNTER — APPOINTMENT (OUTPATIENT)
Dept: GENERAL RADIOLOGY | Facility: HOSPITAL | Age: 48
DRG: 813 | End: 2018-02-03
Attending: NURSE PRACTITIONER
Payer: MEDICARE

## 2018-02-03 LAB
ANION GAP SERPL CALC-SCNC: 13 MMOL/L (ref 0–18)
BASOPHILS # BLD: 0 K/UL (ref 0–0.2)
BASOPHILS NFR BLD: 0 %
BUN SERPL-MCNC: 8 MG/DL (ref 8–20)
BUN/CREAT SERPL: 15.1 (ref 10–20)
CALCIUM SERPL-MCNC: 9.1 MG/DL (ref 8.5–10.5)
CHLORIDE SERPL-SCNC: 100 MMOL/L (ref 95–110)
CO2 SERPL-SCNC: 22 MMOL/L (ref 22–32)
CREAT SERPL-MCNC: 0.53 MG/DL (ref 0.5–1.5)
EOSINOPHIL # BLD: 0 K/UL (ref 0–0.7)
EOSINOPHIL NFR BLD: 0 %
ERYTHROCYTE [DISTWIDTH] IN BLOOD BY AUTOMATED COUNT: 18.8 % (ref 11–15)
GLUCOSE SERPL-MCNC: 109 MG/DL (ref 70–99)
HCT VFR BLD AUTO: 34.9 % (ref 41–52)
HGB BLD-MCNC: 11.1 G/DL (ref 13.5–17.5)
LYMPHOCYTES # BLD: 0.6 K/UL (ref 1–4)
LYMPHOCYTES NFR BLD: 15 %
MAGNESIUM SERPL-MCNC: 1.7 MG/DL (ref 1.8–2.5)
MCH RBC QN AUTO: 26.3 PG (ref 27–32)
MCHC RBC AUTO-ENTMCNC: 31.8 G/DL (ref 32–37)
MCV RBC AUTO: 82.6 FL (ref 80–100)
MONOCYTES # BLD: 0.8 K/UL (ref 0–1)
MONOCYTES NFR BLD: 20 %
NEUTROPHILS # BLD AUTO: 2.5 K/UL (ref 1.8–7.7)
NEUTROPHILS NFR BLD: 64 %
OSMOLALITY UR CALC.SUM OF ELEC: 279 MOSM/KG (ref 275–295)
PLATELET # BLD AUTO: 48 K/UL (ref 140–400)
PMV BLD AUTO: 9.3 FL (ref 7.4–10.3)
POTASSIUM SERPL-SCNC: 3.3 MMOL/L (ref 3.3–5.1)
RBC # BLD AUTO: 4.23 M/UL (ref 4.5–5.9)
SODIUM SERPL-SCNC: 135 MMOL/L (ref 136–144)
VANCOMYCIN TROUGH SERPL-MCNC: <3.5 MCG/ML (ref 10–20)
WBC # BLD AUTO: 3.9 K/UL (ref 4–11)

## 2018-02-03 PROCEDURE — 99233 SBSQ HOSP IP/OBS HIGH 50: CPT | Performed by: OTHER

## 2018-02-03 RX ORDER — MAGNESIUM SULFATE HEPTAHYDRATE 40 MG/ML
2 INJECTION, SOLUTION INTRAVENOUS ONCE
Status: COMPLETED | OUTPATIENT
Start: 2018-02-03 | End: 2018-02-03

## 2018-02-03 RX ORDER — DIAZEPAM 10 MG/1
10 TABLET ORAL 4 TIMES DAILY
Status: DISCONTINUED | OUTPATIENT
Start: 2018-02-03 | End: 2018-02-05

## 2018-02-03 RX ORDER — LORAZEPAM 2 MG/ML
1 INJECTION INTRAMUSCULAR
Status: DISCONTINUED | OUTPATIENT
Start: 2018-02-03 | End: 2018-02-05

## 2018-02-03 RX ORDER — QUETIAPINE 25 MG/1
50 TABLET, FILM COATED ORAL NIGHTLY
Status: DISCONTINUED | OUTPATIENT
Start: 2018-02-03 | End: 2018-02-08

## 2018-02-03 RX ORDER — LORAZEPAM 2 MG/ML
2 INJECTION INTRAMUSCULAR EVERY 30 MIN PRN
Status: DISCONTINUED | OUTPATIENT
Start: 2018-02-03 | End: 2018-02-05

## 2018-02-03 RX ORDER — CLONIDINE HYDROCHLORIDE 0.1 MG/1
0.1 TABLET ORAL 3 TIMES DAILY PRN
Status: DISCONTINUED | OUTPATIENT
Start: 2018-02-03 | End: 2018-02-08

## 2018-02-03 RX ORDER — LORAZEPAM 2 MG/ML
4 INJECTION INTRAMUSCULAR
Status: DISCONTINUED | OUTPATIENT
Start: 2018-02-03 | End: 2018-02-05

## 2018-02-03 RX ORDER — LORAZEPAM 2 MG/ML
3 INJECTION INTRAMUSCULAR EVERY 30 MIN PRN
Status: DISCONTINUED | OUTPATIENT
Start: 2018-02-03 | End: 2018-02-05

## 2018-02-03 RX ORDER — PANTOPRAZOLE SODIUM 40 MG/1
40 TABLET, DELAYED RELEASE ORAL
Status: DISCONTINUED | OUTPATIENT
Start: 2018-02-03 | End: 2018-02-08

## 2018-02-03 RX ORDER — OLANZAPINE 2.5 MG/1
2.5 TABLET ORAL NIGHTLY
Status: DISCONTINUED | OUTPATIENT
Start: 2018-02-03 | End: 2018-02-03

## 2018-02-03 NOTE — PLAN OF CARE
NEUROLOGICAL - ADULT    • Absence of seizures Not Progressing    • Remains free of injury related to seizure activity  No signs of seisure activity noted Not Progressing        Safety Risk - Non-Violent Restraints    • Patient will remain free from self-ha

## 2018-02-03 NOTE — PROGRESS NOTES
[facility] AND [facility] Infectious Disease Progress Note    Winda Standing Patient Status:  Inpatient    1970 MRN [id number]   Location [facility] 2W/SW Attending Kimberly Khan MD   Hosp Day # 5 PCP Abran Ricks MD     Subjective:  Pt Daily PRN  •  bisacodyl (DULCOLAX) rectal suppository 10 mg, 10 mg, Rectal, Daily PRN  •  FLEET ENEMA (FLEET) 7-19 GM/118ML enema 133 mL, 1 enema, Rectal, Once PRN  •  ondansetron HCl (ZOFRAN) injection 4 mg, 4 mg, Intravenous, Q6H PRN  •  Pantoprazole Sod vancomycin and ceftriaxone  2. MSSA UTI  -on abx above  3. Seizure and AMS  -ETOH abuse/withdrawal and above  -hx seizure  -on CIWA    If you have any questions or concerns please call [facility] at [phone].      ABIMAEL JONES NP  2/3/2018  8:57 AM

## 2018-02-03 NOTE — PROGRESS NOTES
Pulmonary Progress Note        NAME: Ronda Lopez - ROOM: 217217-A - MRN: [id number] - Age: 41year old - : 1970    Past Medical History:   Diagnosis Date   • Back problem    • Blind    • ETOH abuse    • Glaucoma    • High blood pressure    • Non- WBC  3.3*  3.6*  3.9*   PLT  37*  35*  48*         Recent Labs   Lab  02/01/18   0514  02/02/18   0437  02/03/18   0423   GLU  122*  119*  109*   BUN  9  8  8   CREATSERUM  0.56  0.55  0.53   GFRAA  >60  >60  >60   GFRNAA  >60  >60  >60   CA  9.3  8.8  9

## 2018-02-03 NOTE — PROGRESS NOTES
[facility] AND [facility]  Gastroenterology Progress Note    Chan Saleem Patient Status:  Inpatient    1970 MRN [id number]   Location [facility] 2W/SW Attending Eliel Perez MD   Hosp Day # 5 PCP Donna Metz MD     Subjective:  Leonard Gandhi workup     -Given anemia will require endoscopic evaluation. Will defer pending improvements in platelet count and resolution of withdrawals.  Precedex gtt stopped today.  -Heme on consult for pancytopenia, consideration given to bone marrow bx     2. [facility]

## 2018-02-03 NOTE — SLP NOTE
SPEECH DAILY NOTE - INPATIENT    ASSESSMENT & PLAN   ASSESSMENT      Pt seen upright in chair with current diet of pureed/thin liquids (breakfast tray) for monitoring of diet tolerance. Tongue remains purple; however, swelling appears to have decreased.  Pt cued to take multiple swallows. No overt clinical signs of aspiration on any swallows of pureed nor thin when Pt initiated cued second swallow (no coughing, no throat clearing and clear vocal quality).     GOAL PROGRESSING     Goal #2 The patient/family/car

## 2018-02-03 NOTE — PROGRESS NOTES
[facility] Hospitalist Progress Note     CC: Hospital Follow up    PCP: Danis Arauz MD       Assessment/Plan:     Principal Problem:     Thrombocytopenia ([facility] [address]  Active Problems:    Hyponatremia    Hypokalemia    Occult blood in stools    Seizure ([facility] [address]    Anushka noted  -US abdomen-mild hepatosplenomegaly with hepatic steatosis  -per heme drop in platelet may be related to infection as well as chronic ETOH, holding off on bone marrow bx   -appreciate heme input      Seizure 2/2 ETOH withdrawal  -CT head negative fo 132/84, pulse 83, temperature 98.3 °F (36.8 °C), resp. rate 18, height 180.3 cm (5' 11\"), weight 164 lb 1.6 oz (74.4 kg), SpO2 95 %.     Temp:  [98.3 °F (36.8 °C)-99.8 °F (37.7 °C)] 98.3 °F (36.8 °C)  Pulse:  [] 83  Resp:  [16-27] 18  BP: (122171)/( 122*  119*  109*   BUN  9  8  8   CREATSERUM  0.56  0.55  0.53   GFRAA  >60  >60  >60   GFRNAA  >60  >60  >60   CA  9.3  8.8  9.1   NA  133*  133*  135*   K  3.9  3.6  3.3   CL  103  104  100   CO2  22  21*  22       Recent Labs   Lab  02/01/18   0514   AL

## 2018-02-03 NOTE — CONSULTS
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 rate 20, height 71\", weight 164 lb 1.6 oz, SpO2 95 %. Mental Status Exam:     The patient is alert to self, oriented to be in the hospital but confused to the condition and date and time. Patient exhibited disorganize thought process. Vancomycin Trough, Serum      Basic Metabolic Panel (8)      CBC With Differential With Platelet      Potassium      Magnesium      Type and screen STAT      ABORH (Blood Type) Once      Antibody Screen Once      Prepare platelets Once      Prepare platele

## 2018-02-03 NOTE — PLAN OF CARE
NEUROLOGICAL - ADULT    • Achieves maximal functionality and self care Not Progressing          HEMATOLOGIC - ADULT    • Maintains hematologic stability Progressing    • Free from bleeding injury Progressing        Impaired Swallowing    • Minimize aspirat

## 2018-02-03 NOTE — PROGRESS NOTES
[facility] Pharmacy Note: Route Optimization for Pantoprazole (PROTONIX)    Patient is currently on Pantoprazole (PROTONIX) 40 mg IV every 12 hours.    The patient meets the criteria to convert to the oral equivalent as established by the IV to Oral conversion pro

## 2018-02-04 LAB
ANION GAP SERPL CALC-SCNC: 9 MMOL/L (ref 0–18)
BASOPHILS # BLD: 0 K/UL (ref 0–0.2)
BASOPHILS NFR BLD: 1 %
BUN SERPL-MCNC: 7 MG/DL (ref 8–20)
BUN/CREAT SERPL: 14.3 (ref 10–20)
CALCIUM SERPL-MCNC: 9 MG/DL (ref 8.5–10.5)
CHLORIDE SERPL-SCNC: 106 MMOL/L (ref 95–110)
CO2 SERPL-SCNC: 22 MMOL/L (ref 22–32)
CREAT SERPL-MCNC: 0.49 MG/DL (ref 0.5–1.5)
EOSINOPHIL # BLD: 0 K/UL (ref 0–0.7)
EOSINOPHIL NFR BLD: 1 %
ERYTHROCYTE [DISTWIDTH] IN BLOOD BY AUTOMATED COUNT: 19.2 % (ref 11–15)
GLUCOSE SERPL-MCNC: 98 MG/DL (ref 70–99)
HCT VFR BLD AUTO: 38.1 % (ref 41–52)
HGB BLD-MCNC: 12 G/DL (ref 13.5–17.5)
LYMPHOCYTES # BLD: 0.9 K/UL (ref 1–4)
LYMPHOCYTES NFR BLD: 30 %
MAGNESIUM SERPL-MCNC: 1.9 MG/DL (ref 1.8–2.5)
MCH RBC QN AUTO: 26.6 PG (ref 27–32)
MCHC RBC AUTO-ENTMCNC: 31.5 G/DL (ref 32–37)
MCV RBC AUTO: 84.4 FL (ref 80–100)
MONOCYTES # BLD: 0.7 K/UL (ref 0–1)
MONOCYTES NFR BLD: 23 %
NEUTROPHILS # BLD AUTO: 1.4 K/UL (ref 1.8–7.7)
NEUTROPHILS NFR BLD: 45 %
OSMOLALITY UR CALC.SUM OF ELEC: 282 MOSM/KG (ref 275–295)
PLATELET # BLD AUTO: 39 K/UL (ref 140–400)
PMV BLD AUTO: 8.9 FL (ref 7.4–10.3)
POTASSIUM SERPL-SCNC: 3.5 MMOL/L (ref 3.3–5.1)
POTASSIUM SERPL-SCNC: 3.5 MMOL/L (ref 3.3–5.1)
RBC # BLD AUTO: 4.51 M/UL (ref 4.5–5.9)
SODIUM SERPL-SCNC: 137 MMOL/L (ref 136–144)
VANCOMYCIN TROUGH SERPL-MCNC: 7.7 MCG/ML (ref 10–20)
WBC # BLD AUTO: 3 K/UL (ref 4–11)

## 2018-02-04 PROCEDURE — 99232 SBSQ HOSP IP/OBS MODERATE 35: CPT | Performed by: OTHER

## 2018-02-04 NOTE — PROGRESS NOTES
[facility] Hospitalist Progress Note     CC: Hospital Follow up    PCP: Za Ortiz MD       Assessment/Plan:     Principal Problem:     Thrombocytopenia ([address]  Active Problems:    Hyponatremia    Hypokalemia    DTs (delirium tremens) (HCC)    Episodic mood d right hip hematoma, tongue now enlarged due to trauma with sz  -2/2 ETOH?  -admit platelets 9-->1 unit platelets ->19-->1 unit platelets>28--> 1unit platelets-->37,-->35,  - currently without bleeding, goal plts > 10k, or >50k if bleeding  -MD Blood smear Still intermittently confused. Required less ativan overnight. Intermittently oriented. Currently oriented x 2. Tolerating pureed diet    OBJECTIVE:    Blood pressure 133/74, pulse 88, temperature 98.9 °F (37.2 °C), temperature source Temporal, resp.  r 26.6*  26.3*  26.6*   MCHC  32.3  31.8*  31.5*   RDW  19.1*  18.8*  19.2*   WBC  3.6*  3.9*  3.0*   PLT  35*  48*  39*         Recent Labs   Lab  02/02/18   0437  02/03/18   0423  02/04/18 0427   GLU  119*  109*  98   BUN  8  8  7*   CREATSERUM  0.55  0.

## 2018-02-04 NOTE — PROGRESS NOTES
[facility] AND [facility] Infectious Disease  Progress Note    Ashlie Dean Patient Status:  Inpatient    1970 MRN [id number]   Location [facility] 2W/SW Attending Mellissa Gimenez MD   Hosp Day # 6 PCP Latha Spaulding MD     Subjective:  Pa Reviewed:  Vancomycin, ceftriaxone    Assessment and Plan:    1.   Subacute bacterial endocarditis with streptococcus mitus/oralis sepsis  - Oral source most likely - panorex ordered  - 2D echo with MV vegetation, MYRA to further evaluate  - IV vancomycin an

## 2018-02-04 NOTE — PROGRESS NOTES
Josefina Esqueda is a 41year old  male who is legally blind with history of chronic alcohol dependency status post seizure withdrawal who presented to the hospital with thrombocytopenia and escalated into delirium tremors.    The patient seen today fo LORazepam (ATIVAN) injection 4 mg 4 mg Intravenous Q15 Min PRN   diazepam (VALIUM) tab 10 mg 10 mg Oral QID   Pantoprazole Sodium (PROTONIX) EC tab 40 mg 40 mg Oral BID AC   Pantoprazole Sodium (PROTONIX) 40 mg in Sodium Chloride 0.9 % 10 mL IV push 40 m PTT 33.0 01/29/2018   INR 1.1 01/29/2018   PTP 13.9 01/29/2018   TSH 2.884 10/11/2017   PSA 0.863 10/11/2017   MG 1.7 (L) 02/03/2018   B12 785 01/30/2018   ETOH 2 01/30/2018         Imaging  Xr Chest Ap Portable  (cpt=71045)    Result Date: 2/1/2018  CON Magnesium      Potassium      Magnesium      Urinalysis with Culture Reflex Once      CBC With Differential With Platelet      Basic Metabolic Panel (8)      Hepatic Function Panel (7)      Potassium      Magnesium      Vancomycin Trough, Serum      Basic

## 2018-02-04 NOTE — PROGRESS NOTES
[facility]  Gastroenterology Progress Note    Rachealfranklyn Odonnell Patient Status:  Inpatient    1970 MRN [id number]   Location [facility] 2W/SW Attending Hawk Callaway MD   Hosp Day # 6 PCP Martin Alejandro MD     Subjective:  Gisella Rios bleeding. Hgb has been stable since admission. Also with noted pancytopenia and very low platelets. No prior endoscopic workup     -Given anemia will require endoscopic evaluation in future.  Will defer pending improvements in platelet count and resolution

## 2018-02-04 NOTE — PROGRESS NOTES
Pulmonary Progress Note        NAME: Dontae Huizar - ROOM: 217/[facility] - MRN: [id number] - Age: 41year old - : 1970    Past Medical History:   Diagnosis Date   • Back problem    • Blind    • ETOH abuse    • Glaucoma    • High blood pressure    • Non- 35*  48*  39*         Recent Labs   Lab  02/02/18   0437  02/03/18   0423  02/04/18 0427   GLU  119*  109*  98   BUN  8  8  7*   CREATSERUM  0.55  0.53  0.49*   GFRAA  >60  >60  >60   GFRNAA  >60  >60  >60   CA  8.8  9.1  9.0   NA  133*  135*  137   K  3

## 2018-02-04 NOTE — PLAN OF CARE
HEMATOLOGIC - ADULT    • Maintains hematologic stability Progressing    • Free from bleeding injury Progressing    Platelet count remains low at 39. Pt will need bone marrow biopsy in the future.       Impaired Swallowing    • Minimize aspiration risk Progr

## 2018-02-04 NOTE — PROGRESS NOTES
120 [facility] dosing service    Follow-up Pharmacokinetic Consult for Vancomycin Dosing     Jing Robertson is a 41year old male admitted on 1/29 who is being treated for bacteremia. Patient is on day 2 of Vancomycin 1 gm IV Q 12 hours.   Goal trough is 15-2 <3.5 ug/mL, pharmacokinetics, 74.4 kg weight and renal function)    2. Pharmacy will re-check Vancomycin trough levels prior to 3rd dose. Goal trough level 15-20 ug/mL. 3.  Pharmacy will need BUN/Scr daily while on Vancomycin to assess renal function.

## 2018-02-04 NOTE — PROGRESS NOTES
120 [facility] dosing service    Follow-up Pharmacokinetic Consult for Vancomycin Dosing     Malinda Lange is a 41year old male admitted on 2/2 who is being treated for bacteremia. Patient is on day 3 of Vancomycin 1.25 gm IV Q 8 hours.   Goal trough is 15- Vancomycin at 1.75 gm IVPB Q 8 hours (based on Trough of 7.7 ug/mL, pharmacokinetics, 74.4 kg weight and renal function)    2. Pharmacy will re-check Vancomycin trough levels prior to 3 dose. Goal trough level 15-20 ug/mL.     3.  Pharmacy will need BUN/S

## 2018-02-05 ENCOUNTER — APPOINTMENT (OUTPATIENT)
Dept: GENERAL RADIOLOGY | Facility: HOSPITAL | Age: 48
DRG: 813 | End: 2018-02-05
Attending: INTERNAL MEDICINE
Payer: MEDICARE

## 2018-02-05 ENCOUNTER — APPOINTMENT (OUTPATIENT)
Dept: CV DIAGNOSTICS | Facility: HOSPITAL | Age: 48
DRG: 813 | End: 2018-02-05
Attending: INTERNAL MEDICINE
Payer: MEDICARE

## 2018-02-05 ENCOUNTER — APPOINTMENT (OUTPATIENT)
Dept: INTERVENTIONAL RADIOLOGY/VASCULAR | Facility: HOSPITAL | Age: 48
DRG: 813 | End: 2018-02-05
Attending: INTERNAL MEDICINE
Payer: MEDICARE

## 2018-02-05 LAB
ANION GAP SERPL CALC-SCNC: 6 MMOL/L (ref 0–18)
BASOPHILS # BLD: 0 K/UL (ref 0–0.2)
BASOPHILS NFR BLD: 1 %
BUN SERPL-MCNC: 6 MG/DL (ref 8–20)
BUN/CREAT SERPL: 12.5 (ref 10–20)
CALCIUM SERPL-MCNC: 9 MG/DL (ref 8.5–10.5)
CHLORIDE SERPL-SCNC: 106 MMOL/L (ref 95–110)
CO2 SERPL-SCNC: 24 MMOL/L (ref 22–32)
CREAT SERPL-MCNC: 0.48 MG/DL (ref 0.5–1.5)
EOSINOPHIL # BLD: 0 K/UL (ref 0–0.7)
EOSINOPHIL NFR BLD: 1 %
ERYTHROCYTE [DISTWIDTH] IN BLOOD BY AUTOMATED COUNT: 18.7 % (ref 11–15)
GLUCOSE SERPL-MCNC: 98 MG/DL (ref 70–99)
HCT VFR BLD AUTO: 34.4 % (ref 41–52)
HGB BLD-MCNC: 11.1 G/DL (ref 13.5–17.5)
HIV1+2 AB SERPL QL IA: NONREACTIVE
LYMPHOCYTES # BLD: 0.8 K/UL (ref 1–4)
LYMPHOCYTES NFR BLD: 24 %
MCH RBC QN AUTO: 26.7 PG (ref 27–32)
MCHC RBC AUTO-ENTMCNC: 32.2 G/DL (ref 32–37)
MCV RBC AUTO: 83.1 FL (ref 80–100)
MONOCYTES # BLD: 0.6 K/UL (ref 0–1)
MONOCYTES NFR BLD: 18 %
NEUTROPHILS # BLD AUTO: 1.8 K/UL (ref 1.8–7.7)
NEUTROPHILS NFR BLD: 56 %
OSMOLALITY UR CALC.SUM OF ELEC: 280 MOSM/KG (ref 275–295)
PLATELET # BLD AUTO: 55 K/UL (ref 140–400)
PMV BLD AUTO: 10.1 FL (ref 7.4–10.3)
POTASSIUM SERPL-SCNC: 3.3 MMOL/L (ref 3.3–5.1)
POTASSIUM SERPL-SCNC: 4 MMOL/L (ref 3.3–5.1)
RBC # BLD AUTO: 4.14 M/UL (ref 4.5–5.9)
SODIUM SERPL-SCNC: 136 MMOL/L (ref 136–144)
VANCOMYCIN TROUGH SERPL-MCNC: 18.3 MCG/ML (ref 10–20)
WBC # BLD AUTO: 3.2 K/UL (ref 4–11)

## 2018-02-05 PROCEDURE — 93320 DOPPLER ECHO COMPLETE: CPT | Performed by: INTERNAL MEDICINE

## 2018-02-05 PROCEDURE — 93325 DOPPLER ECHO COLOR FLOW MAPG: CPT | Performed by: INTERNAL MEDICINE

## 2018-02-05 PROCEDURE — 99232 SBSQ HOSP IP/OBS MODERATE 35: CPT | Performed by: OTHER

## 2018-02-05 PROCEDURE — B246ZZ4 ULTRASONOGRAPHY OF RIGHT AND LEFT HEART, TRANSESOPHAGEAL: ICD-10-PCS | Performed by: INTERNAL MEDICINE

## 2018-02-05 PROCEDURE — 70355 PANORAMIC X-RAY OF JAWS: CPT | Performed by: INTERNAL MEDICINE

## 2018-02-05 RX ORDER — LIDOCAINE 50 MG/G
1 PATCH TOPICAL EVERY 24 HOURS
Status: DISCONTINUED | OUTPATIENT
Start: 2018-02-05 | End: 2018-02-08

## 2018-02-05 RX ORDER — ACETAMINOPHEN 325 MG/1
650 TABLET ORAL EVERY 4 HOURS PRN
Status: DISCONTINUED | OUTPATIENT
Start: 2018-02-05 | End: 2018-02-08

## 2018-02-05 RX ORDER — 0.9 % SODIUM CHLORIDE 0.9 %
VIAL (ML) INJECTION
Status: COMPLETED
Start: 2018-02-05 | End: 2018-02-05

## 2018-02-05 RX ORDER — MIDAZOLAM HYDROCHLORIDE 1 MG/ML
2 INJECTION INTRAMUSCULAR; INTRAVENOUS EVERY 5 MIN PRN
Status: DISCONTINUED | OUTPATIENT
Start: 2018-02-05 | End: 2018-02-08

## 2018-02-05 RX ORDER — DIAZEPAM 10 MG/1
10 TABLET ORAL EVERY 4 HOURS PRN
Status: DISCONTINUED | OUTPATIENT
Start: 2018-02-05 | End: 2018-02-08

## 2018-02-05 RX ORDER — POTASSIUM CHLORIDE 20 MEQ/1
40 TABLET, EXTENDED RELEASE ORAL EVERY 4 HOURS
Status: COMPLETED | OUTPATIENT
Start: 2018-02-05 | End: 2018-02-05

## 2018-02-05 RX ORDER — MIDAZOLAM HYDROCHLORIDE 1 MG/ML
INJECTION INTRAMUSCULAR; INTRAVENOUS
Status: DISPENSED
Start: 2018-02-05 | End: 2018-02-06

## 2018-02-05 RX ORDER — SODIUM CHLORIDE 0.9 % (FLUSH) 0.9 %
10 SYRINGE (ML) INJECTION AS NEEDED
Status: DISCONTINUED | OUTPATIENT
Start: 2018-02-05 | End: 2018-02-08

## 2018-02-05 RX ORDER — SODIUM CHLORIDE 9 MG/ML
INJECTION, SOLUTION INTRAVENOUS
Status: COMPLETED
Start: 2018-02-05 | End: 2018-02-05

## 2018-02-05 RX ORDER — SODIUM CHLORIDE 9 MG/ML
INJECTION, SOLUTION INTRAVENOUS CONTINUOUS
Status: DISCONTINUED | OUTPATIENT
Start: 2018-02-05 | End: 2018-02-06

## 2018-02-05 NOTE — SLP NOTE
SPEECH DAILY NOTE - INPATIENT    ASSESSMENT & PLAN   ASSESSMENT  Pt repositioned upright in bed for diet chk. Pt was alert and with good oral acceptance. Pt reports no difficulties with current diet.  PO trials of puree and thin liquids via cup and straw pr implementation of aspiration precautions and swallow strategies independently over 3 session(s). SLP reviewed aspiration precautions with pt including upright position and chin neutral position. No family present.  Will continue pt and family education a

## 2018-02-05 NOTE — PROGRESS NOTES
120 [facility] dosing service    Follow-up Pharmacokinetic Consult for Vancomycin Dosing     Sandy Araujo is a 41year old male who is being treated for bacteremia. Patient is on day 2 of Vancomycin 1.75 gm IV Q 8 hours. Goal trough is 15-20 ug/mL.     He 1.75 gm IVPB Q 8 hours (based on Trough of 18.3 ug/mL, pharmacokinetics, 74.4 kg weight and renal function)    2. Pharmacy will re-check Vancomycin trough levels prior to 4th dose. Goal trough level 15-20 ug/mL.     3.  Pharmacy will need BUN/Scr daily wh

## 2018-02-05 NOTE — PROGRESS NOTES
[facility] Hospitalist Team  Progress Note    Yennifer Buenrostro Patient Status:  Inpatient    1970 MRN [id number]   Location [facility] 5SW/SE Attending Pam Yeboah MD   Hosp Day # 7 PCP Paola Rojo MD     CC: Follow Up  PCP: Paola Rojo now enlarged due to trauma with sz  -2/2 ETOH?  -admit platelets 9-->s/P 3 units of platelets, platelet count now 55   - currently without bleeding, goal plts > 10k, or >50k if bleeding  -MD Blood smear noted  -US abdomen-mild hepatosplenomegaly with hepat Blood pressure 136/90, pulse 88, temperature 99.3 °F (37.4 °C), temperature source Axillary, resp. rate 19, height 180.3 cm (5' 11\"), weight 164 lb 1.6 oz (74.4 kg), SpO2 99 %.     GENERAL: no apparent distress  NEURO: A/A Ox3  SKIN:  Right  Side of face Intravenous Q15 Min PRN   diazepam (VALIUM) tab 10 mg 10 mg Oral QID   Pantoprazole Sodium (PROTONIX) EC tab 40 mg 40 mg Oral BID AC   Pantoprazole Sodium (PROTONIX) 40 mg in Sodium Chloride 0.9 % 10 mL IV push 40 mg Intravenous BID AC   QUEtiapine Fumarat strength 5/5 in all extremities, large ecchymoses of R anterior hip/groin   Neuro: Grossly normal, CN intact, sensory intact, oriented x 3 this AM, follows commands and answers questions appropriately  Psych: Affect- normal  SKIN: warm, dry, large ecchymos as low as 18  -noted right facial hematoma and right hip hematoma, tongue now enlarged due to trauma with sz  -2/2 ETOH?  -admit platelets 9-->1 unit platelets ->19-->1 unit platelets>28--> 1unit platelets-->37,-->35,  - currently without bleeding, goal pl

## 2018-02-05 NOTE — PROGRESS NOTES
Hematology/Oncology  Progress Note        NAME: Abby Shrestha - ROOM: 217/217-A - MRN: [id number] - Age: 41year old - : 1970    Subjective:  No acute events overnight.  Going to MYRA today     Medications:  • Potassium Chloride ER  40 mEq Oral Q4H rashes or lesions.  + bruising on jaw and extremities     Recent Labs   Lab  02/05/18   0444   RBC  4.14*   HGB  11.1*   HCT  34.4*   MCV  83.1   MCH  26.7*   MCHC  32.2   RDW  18.7*   WBC  3.2*   PLT  55*     Recent Labs   Lab  02/03/18   0423  02/04/18

## 2018-02-05 NOTE — OCCUPATIONAL THERAPY NOTE
Nehemiah Box   OCCUPATIONAL THERAPY EVALUATION - INPATIENT     Room Number: 533/533-A  Evaluation Date: 2/5/2018  Type of Evaluation: Initial       Physician Order: IP Consult to Occupational Therapy  Reason for Therapy: ADL/IADL Dysfunction and Discharge Planning transfers. The patient is below baseline and would benefit from skilled inpatient OT to address the above deficits, maximizing patient's ability to return to prior level of function.       DISCHARGE RECOMMENDATIONS  OT Discharge Recommendations: 24 hour c TOLERANCE  guarded, requried rest after dynamic seated tasks    COGNITION  Overall Cognitive Status:  Impaired    VISION  PATIENT IS LEGALLY BLIND        Communication: wfl     Behavioral/Emotional/Social:Cooperative, Pleasant    RANGE OF MOTION   Upper ex present      OT Goals    Patients self stated goal is: to return home      Patient will complete functional transfer with SBA with Min VC   Comment:     Patient will complete toileting with SBA   Comment:     Patient will tolerate standing for 10 minutes i

## 2018-02-05 NOTE — PROGRESS NOTES
Please see MYRA report for full details. MYRA without definitive vegetation seen. Possible vegetation noted on transthoracic echo is calcification of chordae tendinae.  Nonetheless, MYRA can miss very small vegetations and strep mitis can be the cause of such

## 2018-02-05 NOTE — CONSULTS
[facility]    PATIENT'S NAME: Fermin [place]   ATTENDING PHYSICIAN: Roseanna Quesada. Cleveland Mortensen MD   CONSULTING PHYSICIAN: Bret River MD   PATIENT ACCOUNT#:   [id number]    LOCATION:  [address] [place] [address] #:   [id number]       DATE OF B There is no edema. SKIN:  He has small black marks/papules over his finger tips and toes. PSYCHIATRIC:  The patient is cooperative. NEUROLOGIC:  The patient is moving all extremities. He is tremulous.     LABORATORY DATA:  Glucose 98, sodium 136, potass

## 2018-02-05 NOTE — PLAN OF CARE
Problem: SAFETY ADULT - FALL  Goal: Free from fall injury  INTERVENTIONS:  - Assess pt frequently for physical needs  - Identify cognitive and physical deficits and behaviors that affect risk of falls.   - [place] fall precautions as indicated by assessme document and report duration and description of seizure to MD/LIP  - If seizure occurs, turn patient to side and suction secretions as needed  - Reorient patient post seizure  - Seizure pads on all 4 side rails  - Instruct patient/family to notify RN of an unnecessary medical devices as soon as possible  - Assess the patient's physical comfort, circulation, skin condition, hydration, nutrition and elimination needs   - Reorient and redirection as needed  - Assess for the need to continue restraints   Outcome

## 2018-02-05 NOTE — PROGRESS NOTES
[facility]  Gastroenterology Progress Note    Jing Blunt Patient Status:  Inpatient    1970 MRN [id number]   Location [facility] 5SW/SE Attending Efra Wu MD   Hosp Day # 7 PCP Dona Perera MD     Subjective:  Yuval Palacios

## 2018-02-05 NOTE — PROGRESS NOTES
Nathanael Hatchet is a 41year old  male who is legally blind with history of chronic alcohol dependency status post seizure withdrawal who presented to the hospital with thrombocytopenia and escalated into delirium tremors.    The patient seen today fo mg 0.1 mg Oral TID PRN   metoprolol Tartrate (LOPRESSOR) tab 25 mg 25 mg Oral BID PRN   LORazepam (ATIVAN) injection 1 mg 1 mg Intravenous Q1H PRN   LORazepam (ATIVAN) injection 2 mg 2 mg Intravenous Q30 Min PRN   LORazepam (ATIVAN) injection 3 mg 3 mg Int 02/01/2018   TP 8.0 02/01/2018   AST 81 (H) 02/01/2018   ALT 44 02/01/2018   PTT 33.0 01/29/2018   INR 1.1 01/29/2018   PTP 13.9 01/29/2018   TSH 2.884 10/11/2017   PSA 0.863 10/11/2017   MG 1.9 02/04/2018   B12 785 01/30/2018   ETOH 2 01/30/2018         I Potassium      Magnesium      Urinalysis with Culture Reflex Once      CBC With Differential With Platelet      Basic Metabolic Panel (8)      Hepatic Function Panel (7)      Potassium      Magnesium      Vancomycin Trough, Serum      Basic Metabolic Panel

## 2018-02-05 NOTE — CM/SW NOTE
Addend 413p: SW received call from pt's sister, Sona Guerra who requested additional referral to [facility].      SW asked MAYUR/Nelsy to send referral to [facility].   ----------------------------------------------------------------------------  SW received order for discharg

## 2018-02-06 LAB
ALBUMIN SERPL BCP-MCNC: 3.1 G/DL (ref 3.5–4.8)
ALP SERPL-CCNC: 101 U/L (ref 32–100)
ALT SERPL-CCNC: 87 U/L (ref 17–63)
ANION GAP SERPL CALC-SCNC: 11 MMOL/L (ref 0–18)
AST SERPL-CCNC: 122 U/L (ref 15–41)
BASOPHILS # BLD: 0 K/UL (ref 0–0.2)
BASOPHILS NFR BLD: 1 %
BILIRUB DIRECT SERPL-MCNC: 0.6 MG/DL (ref 0–0.2)
BILIRUB SERPL-MCNC: 1.6 MG/DL (ref 0.3–1.2)
BUN SERPL-MCNC: 14 MG/DL (ref 8–20)
BUN/CREAT SERPL: 11.3 (ref 10–20)
CALCIUM SERPL-MCNC: 9.7 MG/DL (ref 8.5–10.5)
CHLORIDE SERPL-SCNC: 107 MMOL/L (ref 95–110)
CO2 SERPL-SCNC: 22 MMOL/L (ref 22–32)
CREAT SERPL-MCNC: 1.24 MG/DL (ref 0.5–1.5)
EOSINOPHIL # BLD: 0 K/UL (ref 0–0.7)
EOSINOPHIL NFR BLD: 1 %
ERYTHROCYTE [DISTWIDTH] IN BLOOD BY AUTOMATED COUNT: 18.8 % (ref 11–15)
GLUCOSE SERPL-MCNC: 111 MG/DL (ref 70–99)
HCT VFR BLD AUTO: 36.3 % (ref 41–52)
HGB BLD-MCNC: 11.6 G/DL (ref 13.5–17.5)
LYMPHOCYTES # BLD: 0.6 K/UL (ref 1–4)
LYMPHOCYTES NFR BLD: 15 %
MCH RBC QN AUTO: 26.7 PG (ref 27–32)
MCHC RBC AUTO-ENTMCNC: 31.9 G/DL (ref 32–37)
MCV RBC AUTO: 83.8 FL (ref 80–100)
MONOCYTES # BLD: 0.7 K/UL (ref 0–1)
MONOCYTES NFR BLD: 16 %
NEUTROPHILS # BLD AUTO: 2.8 K/UL (ref 1.8–7.7)
NEUTROPHILS NFR BLD: 67 %
OSMOLALITY UR CALC.SUM OF ELEC: 291 MOSM/KG (ref 275–295)
PLATELET # BLD AUTO: 60 K/UL (ref 140–400)
PMV BLD AUTO: 9.6 FL (ref 7.4–10.3)
POTASSIUM SERPL-SCNC: 3.6 MMOL/L (ref 3.3–5.1)
POTASSIUM SERPL-SCNC: 4.3 MMOL/L (ref 3.3–5.1)
PROT SERPL-MCNC: 7.5 G/DL (ref 5.9–8.4)
RBC # BLD AUTO: 4.34 M/UL (ref 4.5–5.9)
SODIUM SERPL-SCNC: 140 MMOL/L (ref 136–144)
WBC # BLD AUTO: 4.2 K/UL (ref 4–11)

## 2018-02-06 PROCEDURE — 99233 SBSQ HOSP IP/OBS HIGH 50: CPT | Performed by: OTHER

## 2018-02-06 RX ORDER — MELATONIN
100 DAILY
Status: DISCONTINUED | OUTPATIENT
Start: 2018-02-06 | End: 2018-02-08

## 2018-02-06 RX ORDER — POTASSIUM CHLORIDE 20 MEQ/1
40 TABLET, EXTENDED RELEASE ORAL EVERY 4 HOURS
Status: COMPLETED | OUTPATIENT
Start: 2018-02-06 | End: 2018-02-06

## 2018-02-06 RX ORDER — VENLAFAXINE HYDROCHLORIDE 37.5 MG/1
37.5 CAPSULE, EXTENDED RELEASE ORAL DAILY
Status: DISCONTINUED | OUTPATIENT
Start: 2018-02-07 | End: 2018-02-08

## 2018-02-06 RX ORDER — FOLIC ACID 1 MG/1
1 TABLET ORAL DAILY
Status: DISCONTINUED | OUTPATIENT
Start: 2018-02-06 | End: 2018-02-08

## 2018-02-06 RX ORDER — 0.9 % SODIUM CHLORIDE 0.9 %
VIAL (ML) INJECTION
Status: COMPLETED
Start: 2018-02-06 | End: 2018-02-06

## 2018-02-06 NOTE — PLAN OF CARE
Problem: Patient Centered Care  Goal: Patient preferences are identified and integrated in the patient's plan of care  Interventions:  - What would you like us to know as we care for you?  Patient is blind, can get confused when he wakes up and doesn't see stability and activity tolerance for standing, transferring and ambulating w/ or w/o assistive devices  - Assist with transfers and ambulation using safe patient handling equipment as needed  - Advance activity as appropriate     Outcome: Progressing Plan goals for specific interventions   Outcome: Progressing    Goal: Patient/Family Short Term Goal  Patient's Short Term Goal: absence signs of bleeding     Interventions:   - cbc, skin check , transfusion   - See additional Care Plan goals for specific

## 2018-02-06 NOTE — PROGRESS NOTES
[facility]  Progress Note    Dontae Huizar Patient Status:  Inpatient    1970 MRN [id number]   Location [facility] 5SW/SE Attending Savannah Herron MD   Hosp Day # 8 PCP Krys Sen MD     Impression:  IMPRESSION: 2 mg Intravenous Q5 Min PRN   diazepam (VALIUM) tab 10 mg 10 mg Oral Q4H PRN   CefTRIAXone Sodium (ROCEPHIN) 2 g in sodium chloride 0.9 % 100 mL MBP/ADD-vantage 2 g Intravenous Q24H   Vancomycin HCl (VANCOCIN) 1,750 mg in sodium chloride 0.9 % 500 mL IVPB Wall thickness was normal.     Systolic function was normal. The estimated ejection fraction was 60%. Wall     motion was normal; there were no regional wall motion abnormalities. No     evidence of thrombus. 2. Mitral valve: No evidence of vegetation.  Al

## 2018-02-06 NOTE — DIETARY NOTE
ADULT NUTRITION INITIAL ASSESSMENT    Pt is at moderate nutrition risk. Pt does not meet malnutrition criteria. RECOMMENDATIONS TO MD:  Consider resuming MVI po daily as safe.       NUTRITION DIAGNOSIS/PROBLEM:  Inadequate oral intake related to Alter from Last 6 Encounters:  02/02/18 : 74.4 kg (164 lb 1.6 oz)  01/29/18 : 79.4 kg (175 lb)  01/27/18 : 75.8 kg (167 lb)  12/12/17 : 81.6 kg (180 lb)  10/12/17 : 81.6 kg (180 lb)  10/11/17 : 82 kg (180 lb 12.8 oz)    GASTROINTESTINAL PROBLEMS: difficulty chew

## 2018-02-06 NOTE — PHYSICAL THERAPY NOTE
PHYSICAL THERAPY EVALUATION - INPATIENT     Room Number: 533/533-A  Evaluation Date: 2/6/2018  Type of Evaluation: Initial  Physician Order: PT Eval and Treat    Presenting Problem:  Thrombocytopenia, seizure  Reason for Therapy: Mobility Dysfunction an Legally blind    Alcohol dependence, continuous ([facility]      Past Medical History  Past Medical History:   Diagnosis Date   • Back problem    • Blind    • ETOH abuse    • Glaucoma    • High blood pressure    • Non-24 hour sleep-wake rhythm 10/11/2017   • Ret wheelchair, bedside commode, etc.): None   -   Moving from lying on back to sitting on the side of the bed?: None   How much help from another person does the patient currently need. ..   -   Moving to and from a bed to a chair (including a wheelchair)? : A

## 2018-02-06 NOTE — PROGRESS NOTES
[facility]  Gastroenterology Progress Note    Malinda Shanani Patient Status:  Inpatient    1970 MRN [id number]   Location [facility] 5SW/SE Attending Romel Husain MD   Hosp Day # 8 PCP Roberto Carlos Tesfaye MD     Subjective:  Willy Acevedo anesthesiology due to anticipated intolerance/difficulty with conscious sedation. Alcoholic liver disease. Jacqueline Tovar MD [facility]  2/5/2018  12:02 PM

## 2018-02-06 NOTE — BH PROGRESS NOTE
Behavioral Health SOAP Note  SUBJECTIVE           Patient is a 42 y/o male admitted with a h/o etoh withdrawal.  Patient has a h/o prior admissions for etoh withdrawal seizures. This writer met with the patient at bedside today.   The patient was laying in writer again. SW encouraged him to talk further with his sister this evening but he was not receptive to this.   OBJECTIVE  Conscious/Orientation: Alert and oriented to self, place and time  Appearance: laying in bed in a hospital gown  Behavior: calm

## 2018-02-06 NOTE — PROGRESS NOTES
[facility] AND [facility] Infectious Disease  Progress Note    Du Mai Patient Status:  Inpatient    1970 MRN [id number]   Location [facility] 5SW/SE Attending Jovanny Clarke MD   Hosp Day # 8 PCP Karyn Zamora MD     Subjective:  Josafat Linear motion was normal; there were no regional wall motion abnormalities. 2. Mitral valve:  There was a possible vegetation.     Antibiotics Reviewed:  Vancomycin, ceftriaxone     Assessment and Plan:     1.  Subacute bacterial endocarditis with streptococcus m

## 2018-02-06 NOTE — PROGRESS NOTES
Rita Orantes is a 41year old  male who is legally blind with history of chronic alcohol dependency status post seizure withdrawal who presented to the hospital with thrombocytopenia and escalated into delirium tremors.    The patient seen today fo 50 mcg Intravenous Q5 Min PRN   Midazolam HCl (VERSED) 2 MG/2ML injection 2 mg 2 mg Intravenous Q5 Min PRN   CefTRIAXone Sodium (ROCEPHIN) 2 g in sodium chloride 0.9 % 100 mL MBP/ADD-vantage 2 g Intravenous Q24H   Vancomycin HCl (VANCOCIN) 1,750 mg in sodi INR 1.1 01/29/2018   PTP 13.9 01/29/2018   TSH 2.884 10/11/2017   PSA 0.863 10/11/2017   MG 1.9 02/04/2018   B12 785 01/30/2018   ETOH 2 01/30/2018         Imaging  Xr Panoramic Of Jaws (cpt=70355)    Result Date: 2/5/2018  CONCLUSION: Normal examination Panel (8)      Hepatic Function Panel (7)      Potassium      Magnesium      Vancomycin Trough, Serum      Basic Metabolic Panel (8)      Potassium      Magnesium      Vancomycin Trough, Serum      HIV Ag Ab Combo      Vancomycin Trough, Serum      CBC Wit

## 2018-02-06 NOTE — CM/SW NOTE
SUSANNA spoke w/ Medina Moreno from [facility], who stated they are unable to accept pt due to hx w/ etoh abuse. SW messaged [place] from [facility] regarding referral and bed availability. Per NP/Nicole, anticipated discharge Thursday.      Chante Fernando, [address]

## 2018-02-06 NOTE — PROGRESS NOTES
[facility] Hospitalist Team  Progress Note    Malinda Lange Patient Status:  Inpatient    1970 MRN [id number]   Location [facility] 5SW/SE Attending Romel Husain MD   Hosp Day # 8 PCP Roberto Carlos Tesfaye MD     CC: Follow Up  PCP: Roberto Carlos Tesfaye heat  -lidoderm patch     Acute on Chronic Thrombocytopenia-Improved   -Epic labs reviewed highest platelet count 474, noted previous admission at [place] [address] in 12/24/16 to 1/10/17 with platelets as low as 18  -noted right facial hematoma and right hip hemat Wendy Barrios MD      Concerns regarding plan of care were discussed with patient. Patient agrees with plan as detailed above.  Discussed plan of care with Dr. Noa Mojica RN, NP   [facility] Hospitalist Team  Pager [phone]   Answering Service num hours.        MEDICATIONS        Current Facility-Administered Medications:  Potassium Chloride ER (K-DUR M20) CR tab 40 mEq 40 mEq Oral Q4H   bisacodyl (DULCOLAX) EC tab 20 mg 20 mg Oral Once   polyethylene glycol (GOLYTELY) solution 4,000 mL 4,000 mL [facility] IMAGING     Xr Panoramic Of Jaws (cpt=70355)    Result Date: 2/5/2018  CONCLUSION: Normal examination. SEE ATTENDING NOTE BELOW:     Patient examined and assessed independently. Agree with above APN assessment.      S: feels well.  Able -SW consult- has never done rehab, will need medical rehab at d/c  - psych consulted, valium 10 mg QID scheduled, has ativan for breakthrough via CIWA protocol; started on zyprexa nightly     Sepsis/Bacteremia/UTI  -WBC 3  -CXR negative for PNA  -UA ++, UTI  -follow  - discontinue ng 2/3     Elevated LFT's  -likely due to ETOH  -improving since admission   -hepatitis panel negative  -US abdomen- mild hepatosplenomegaly with hepatic steatosis  -DF <32, no need for steroids  -appreciate GI input     Dilcia Rincon

## 2018-02-06 NOTE — CM/SW NOTE
[facility] 3 = unable to accept due to etoh hx  [place] = currently reviewing    SW spoke w/ Newton David from [facility] and requested to meet pt for on site evaluation; If able, SUSANNA requested Newton David to see pt prior to 215pm procedure on Wednesday.      SW updated pt's

## 2018-02-07 ENCOUNTER — ANESTHESIA EVENT (OUTPATIENT)
Dept: ENDOSCOPY | Facility: HOSPITAL | Age: 48
DRG: 813 | End: 2018-02-07
Payer: MEDICARE

## 2018-02-07 ENCOUNTER — ANESTHESIA (OUTPATIENT)
Dept: ENDOSCOPY | Facility: HOSPITAL | Age: 48
DRG: 813 | End: 2018-02-07
Payer: MEDICARE

## 2018-02-07 ENCOUNTER — SURGERY (OUTPATIENT)
Age: 48
End: 2018-02-07

## 2018-02-07 LAB
ANION GAP SERPL CALC-SCNC: 8 MMOL/L (ref 0–18)
BASOPHILS # BLD: 0 K/UL (ref 0–0.2)
BASOPHILS NFR BLD: 1 %
BILIRUB UR QL: NEGATIVE
BUN SERPL-MCNC: 11 MG/DL (ref 8–20)
BUN/CREAT SERPL: 9.2 (ref 10–20)
CALCIUM SERPL-MCNC: 9.6 MG/DL (ref 8.5–10.5)
CHLORIDE SERPL-SCNC: 104 MMOL/L (ref 95–110)
CLARITY UR: CLEAR
CO2 SERPL-SCNC: 25 MMOL/L (ref 22–32)
COLOR UR: YELLOW
CREAT SERPL-MCNC: 1.2 MG/DL (ref 0.5–1.5)
EOSINOPHIL # BLD: 0.1 K/UL (ref 0–0.7)
EOSINOPHIL NFR BLD: 1 %
ERYTHROCYTE [DISTWIDTH] IN BLOOD BY AUTOMATED COUNT: 18.6 % (ref 11–15)
GLUCOSE SERPL-MCNC: 88 MG/DL (ref 70–99)
GLUCOSE UR-MCNC: NEGATIVE MG/DL
HCT VFR BLD AUTO: 35.4 % (ref 41–52)
HGB BLD-MCNC: 11.2 G/DL (ref 13.5–17.5)
HGB UR QL STRIP.AUTO: NEGATIVE
KETONES UR-MCNC: NEGATIVE MG/DL
LEUKOCYTE ESTERASE UR QL STRIP.AUTO: NEGATIVE
LYMPHOCYTES # BLD: 1.3 K/UL (ref 1–4)
LYMPHOCYTES NFR BLD: 22 %
MAGNESIUM SERPL-MCNC: 1.4 MG/DL (ref 1.8–2.5)
MCH RBC QN AUTO: 26.5 PG (ref 27–32)
MCHC RBC AUTO-ENTMCNC: 31.6 G/DL (ref 32–37)
MCV RBC AUTO: 83.8 FL (ref 80–100)
MONOCYTES # BLD: 0.8 K/UL (ref 0–1)
MONOCYTES NFR BLD: 14 %
NEUTROPHILS # BLD AUTO: 3.5 K/UL (ref 1.8–7.7)
NEUTROPHILS NFR BLD: 62 %
NITRITE UR QL STRIP.AUTO: NEGATIVE
OSMOLALITY UR CALC.SUM OF ELEC: 283 MOSM/KG (ref 275–295)
PH UR: 6 [PH] (ref 5–8)
PLATELET # BLD AUTO: 100 K/UL (ref 140–400)
PMV BLD AUTO: 10.3 FL (ref 7.4–10.3)
POTASSIUM SERPL-SCNC: 3.7 MMOL/L (ref 3.3–5.1)
PROT UR-MCNC: NEGATIVE MG/DL
RBC # BLD AUTO: 4.22 M/UL (ref 4.5–5.9)
SODIUM SERPL-SCNC: 137 MMOL/L (ref 136–144)
SP GR UR STRIP: 1.01 (ref 1–1.03)
UROBILINOGEN UR STRIP-ACNC: <2
VIT C UR-MCNC: NEGATIVE MG/DL
WBC # BLD AUTO: 5.7 K/UL (ref 4–11)

## 2018-02-07 PROCEDURE — 0DBN8ZX EXCISION OF SIGMOID COLON, VIA NATURAL OR ARTIFICIAL OPENING ENDOSCOPIC, DIAGNOSTIC: ICD-10-PCS | Performed by: INTERNAL MEDICINE

## 2018-02-07 PROCEDURE — 0DBP8ZX EXCISION OF RECTUM, VIA NATURAL OR ARTIFICIAL OPENING ENDOSCOPIC, DIAGNOSTIC: ICD-10-PCS | Performed by: INTERNAL MEDICINE

## 2018-02-07 PROCEDURE — B548ZZA ULTRASONOGRAPHY OF SUPERIOR VENA CAVA, GUIDANCE: ICD-10-PCS | Performed by: HOSPITALIST

## 2018-02-07 PROCEDURE — 02HV33Z INSERTION OF INFUSION DEVICE INTO SUPERIOR VENA CAVA, PERCUTANEOUS APPROACH: ICD-10-PCS | Performed by: HOSPITALIST

## 2018-02-07 PROCEDURE — 0DBM8ZX EXCISION OF DESCENDING COLON, VIA NATURAL OR ARTIFICIAL OPENING ENDOSCOPIC, DIAGNOSTIC: ICD-10-PCS | Performed by: INTERNAL MEDICINE

## 2018-02-07 PROCEDURE — 0DJ08ZZ INSPECTION OF UPPER INTESTINAL TRACT, VIA NATURAL OR ARTIFICIAL OPENING ENDOSCOPIC: ICD-10-PCS | Performed by: INTERNAL MEDICINE

## 2018-02-07 RX ORDER — SODIUM CHLORIDE, SODIUM LACTATE, POTASSIUM CHLORIDE, CALCIUM CHLORIDE 600; 310; 30; 20 MG/100ML; MG/100ML; MG/100ML; MG/100ML
INJECTION, SOLUTION INTRAVENOUS CONTINUOUS
Status: DISCONTINUED | OUTPATIENT
Start: 2018-02-07 | End: 2018-02-08

## 2018-02-07 RX ORDER — NALOXONE HYDROCHLORIDE 0.4 MG/ML
80 INJECTION, SOLUTION INTRAMUSCULAR; INTRAVENOUS; SUBCUTANEOUS AS NEEDED
Status: DISCONTINUED | OUTPATIENT
Start: 2018-02-07 | End: 2018-02-07 | Stop reason: HOSPADM

## 2018-02-07 RX ORDER — MAGNESIUM OXIDE 400 MG (241.3 MG MAGNESIUM) TABLET
800 TABLET ONCE
Status: COMPLETED | OUTPATIENT
Start: 2018-02-07 | End: 2018-02-07

## 2018-02-07 RX ORDER — POTASSIUM CHLORIDE 20 MEQ/1
40 TABLET, EXTENDED RELEASE ORAL ONCE
Status: COMPLETED | OUTPATIENT
Start: 2018-02-07 | End: 2018-02-07

## 2018-02-07 RX ORDER — LIDOCAINE HYDROCHLORIDE 10 MG/ML
0.5 INJECTION, SOLUTION INFILTRATION; PERINEURAL ONCE AS NEEDED
Status: ACTIVE | OUTPATIENT
Start: 2018-02-07 | End: 2018-02-07

## 2018-02-07 RX ORDER — LIDOCAINE HYDROCHLORIDE 10 MG/ML
INJECTION, SOLUTION EPIDURAL; INFILTRATION; INTRACAUDAL; PERINEURAL AS NEEDED
Status: DISCONTINUED | OUTPATIENT
Start: 2018-02-07 | End: 2018-02-07 | Stop reason: SURG

## 2018-02-07 RX ORDER — SODIUM CHLORIDE, SODIUM LACTATE, POTASSIUM CHLORIDE, CALCIUM CHLORIDE 600; 310; 30; 20 MG/100ML; MG/100ML; MG/100ML; MG/100ML
INJECTION, SOLUTION INTRAVENOUS CONTINUOUS PRN
Status: DISCONTINUED | OUTPATIENT
Start: 2018-02-07 | End: 2018-02-07 | Stop reason: SURG

## 2018-02-07 RX ORDER — SODIUM CHLORIDE 0.9 % (FLUSH) 0.9 %
10 SYRINGE (ML) INJECTION AS NEEDED
Status: DISCONTINUED | OUTPATIENT
Start: 2018-02-07 | End: 2018-02-08

## 2018-02-07 RX ADMIN — SODIUM CHLORIDE, SODIUM LACTATE, POTASSIUM CHLORIDE, CALCIUM CHLORIDE: 600; 310; 30; 20 INJECTION, SOLUTION INTRAVENOUS at 15:15:00

## 2018-02-07 RX ADMIN — MIDAZOLAM HYDROCHLORIDE 2 MG: 1 INJECTION INTRAMUSCULAR; INTRAVENOUS at 14:45:00

## 2018-02-07 RX ADMIN — LIDOCAINE HYDROCHLORIDE 25 MG: 10 INJECTION, SOLUTION EPIDURAL; INFILTRATION; INTRACAUDAL; PERINEURAL at 14:45:00

## 2018-02-07 RX ADMIN — SODIUM CHLORIDE, SODIUM LACTATE, POTASSIUM CHLORIDE, CALCIUM CHLORIDE: 600; 310; 30; 20 INJECTION, SOLUTION INTRAVENOUS at 14:42:00

## 2018-02-07 NOTE — CM/SW NOTE
SUSANNA spoke w/ Tea Bravo from [place] who stated they are able to accept pt at [place]. Anticipated dc for Thursday 2/8.      SUSANNA updated pt's sister, Meenu Quispe, [address]

## 2018-02-07 NOTE — ANESTHESIA PREPROCEDURE EVALUATION
Anesthesia PreOp Note    HPI:     Diana Murillo is a 41year old male who presents for preoperative consultation requested by: Rogelio Hirsch MD    Date of Surgery: 1/29/2018 - 2/7/2018    Procedure(s):  COLONOSCOPY  ESOPHAGOGASTRODUODENOSCOPY (EGD)  Ind Facility-Administered Medications Ordered in Epic:  folic acid (FOLVITE) tab 1 mg 1 mg Oral Daily Nicole Weinstein NP 1 mg at 02/07/18 2266    Thiamine HCl tab 100 mg 100 mg Oral Daily Daly Ibanez  mg at 02/07/18 0922    venlafaxine (EFFEXOR-XR) Normal Saline Flush 0.9 % injection 3 mL 3 mL Intravenous PRN Georgette Ferrer DO 3 mL at 02/06/18 0823    PEG 3350 (MIRALAX) powder packet 17 g 17 g Oral Daily PRN Georgette Ferrer DO     magnesium hydroxide (MILK OF MAGNESIA) 400 MG/5ML suspension 30 mL 30 mL kg/m². height is 1.803 m (5' 11\") and weight is 74.4 kg (164 lb 1.6 oz). His temperature is 98.6 °F (37 °C). His blood pressure is 112/73 and his pulse is 97. His respiration is 17 and oxygen saturation is 97%.     02/06/18 2019 02/07/18  2269 02/07/18

## 2018-02-07 NOTE — PLAN OF CARE
Problem: Patient Centered Care  Goal: Patient preferences are identified and integrated in the patient's plan of care  Interventions:  - What would you like us to know as we care for you?  Patient is blind, can get confused when he wakes up and doesn't see internal bleeding  - Monitor lab trends   Outcome: Progressing      Problem: MUSCULOSKELETAL - ADULT  Goal: Return mobility to safest level of function  INTERVENTIONS:  - Assess patient stability and activity tolerance for standing, transferring and ambula quality is noted   Outcome: Progressing  Pt states tongue swelling has decreased.      Problem: Patient/Family Goals  Goal: Patient/Family Long Term Goal  Patient's Long Term Goal: abstinence from ETOH    Interventions:  - CIWWA, frequent reorientation   -

## 2018-02-07 NOTE — OPERATIVE REPORT
COLONOSCOPY AND ESOPHAGOGASTRODUODENOSCOPY REPORT    Patient Name:  Stu Perez Record #: [id number]  YOB: 1970  Date of Procedure: 2/7/2018    Referring physician: Krys Sen MD    Surgeon:  Easton Tovar.  Raven Arroyo MD    Pre-op hernia. Upon observation we saw 1 slightly prominent vein in the mid esophagus which was photographed. The stomach demonstrated normal distensibility. There were no retained gastric contents and no outlet obstruction.   There was no evidence of ulcers o

## 2018-02-07 NOTE — INTERVAL H&P NOTE
Pre-op Diagnosis: anemia, heme positive stools    The above referenced H&P was reviewed by Peter Boykin MD on 2/7/2018, the patient was examined and no significant changes have occurred in the patient's condition since the H&P was performed.   I discuss

## 2018-02-07 NOTE — PLAN OF CARE
Problem: HEMATOLOGIC - ADULT  Goal: Maintains hematologic stability  INTERVENTIONS  - Assess for signs and symptoms of bleeding or hemorrhage  - Monitor labs and vital signs for trends  - Administer supportive blood products/factors, fluids and medications functionality and self care  INTERVENTIONS  - Monitor swallowing and airway patency with patient fatigue and changes in neurological status  - Encourage and assist patient to increase activity and self care with guidance from PT/OT     Outcome: Progressing

## 2018-02-07 NOTE — ANESTHESIA POSTPROCEDURE EVALUATION
Patient: Eveline White    Procedure Summary     Date:  02/07/18 Room / Location:  [address] [place] [address] ENDOSCOPY 03 / [address] [place] [address] ENDOSCOPY    Anesthesia Start:  3336 Anesthesia Stop:  4621    Procedures:       COLONOSCOPY (N/A )      ESOPHAGOGASTRODUODENOSCOPY (EGD) (N/A ) Diagnos

## 2018-02-07 NOTE — PROGRESS NOTES
[facility] AND CLINICS  [facility] Infectious Disease  Progress Note    Ronda Antes Patient Status:  Inpatient    1970 MRN [id number]   Location [facility] 5SW/SE Attending Manny Godoy MD   Hosp Day # 9 PCP Caden Burgos MD     Subjective:  Anastacio Part Plan:     1.  Subacute bacterial endocarditis with streptococcus mitus/oralis sepsis  - Oral source most likely - panorex noted and no obvious issues  - 2D echo with MV vegetation, MYRA without obvious vegetation - thought that MV findings may be chordae  -

## 2018-02-07 NOTE — PROGRESS NOTES
[facility] Hospitalist Team  Progress Note    Yennifer Buenrostro Patient Status:  Inpatient    1970 MRN [id number]   Location [facility] 5SW/SE Attending Pam Yeboah MD   Hosp Day # 5 PCP Paola Rojo MD     CC: Follow Up  PCP: Paola Rojo possible endocarditis  -ceftriaxone, vanco, will need IV abx on discharge-at least 4 weeks per IV of ceftriaxone with end date 3/1, plans for PICC today   -appreciate ID input     MSSA UTI  -UA ++, culture with staph aureus  -abx as above  -as per ID    Ne for procedure, speech to re eval after to assess if can advance diet      Prophy  -SCD  -no AC with low platelets     Dispo  -pending clinical course   Haja Leonardjenny [phone] at [facility] and given update  PCP: Yudith Batista MD      Concerns regarding plan of (FOLVITE) tab 1 mg 1 mg Oral Daily   Thiamine HCl tab 100 mg 100 mg Oral Daily   venlafaxine (EFFEXOR-XR) 24 hr cap 37.5 mg Oral Daily   Normal Saline Flush 0.9 % injection 10 mL 10 mL Intravenous PRN   acetaminophen (TYLENOL) tab 650 mg 650 mg Oral Q4H WI kg)   SpO2 97%   BMI 22.89 kg/m²      Exam:  GEN:  male in NAD, bruising and swelling over R lower jaw- improved from admission; speech more clear; oriented x 3   HEENT: EOMI, tongue less swollen, still with bruising  Neck: Supple   Pulm: CTAB, no repeat Bcx NGTD  - unable to determine if present on admission  - concern for oral source of bacteremia given hx of poor dentition, has not seen dentist in >20 years  - plan for oral XR when patient able to sit up on his own for testing, will also need MYRA able     Anemia/OB + stools  -hgb stable at 11's  -OB stool + in ER  -protonix  -plans for EGD/Colon today  -as per GI     Hypokalemia/Hypomagnesemia  -replete via protocol     Dispo- EGD/colonoscopy today, then likely SNF tomorrow     Rest as above.     G

## 2018-02-07 NOTE — H&P (VIEW-ONLY)
[facility]    Report of Consultation    Eveline Oscar Patient Status:  Inpatient    1970 MRN [id number]   Location [facility] 2W/SW Attending William Bhatt MD   Hosp Day # 1 PCP Nae Justice MD     Date of Admission: infusion 0.2-1.5 mcg/kg/hr Intravenous Continuous PRN   LORazepam (ATIVAN) injection 1 mg 1 mg Intravenous Q1H PRN   LORazepam (ATIVAN) injection 2 mg 2 mg Intravenous Q30 Min PRN   LORazepam (ATIVAN) injection 3 mg 3 mg Intravenous Q30 Min PRN   LORazepam bruising  ENDOCRINE: no temperature intolerance    Physical Exam:   Blood pressure 148/96, pulse 79, temperature 100 °F (37.8 °C), temperature source Temporal, resp. rate 15, height 5' 11\" (1.803 m), weight 169 lb 1.6 oz (76.7 kg), SpO2 99 %.   GENERAL: we with attention to the right side. However, soft tissue abnormality cannot be adequately assessed on this study. Please see above. Xr Soft Tissue Neck (cpt=70360)    Result Date: 1/30/2018  PROCEDURE: XR SOFT TISSUE NECK (CPT=70360)  COMPARISON: None.   I hemorrhage, mass, or acute infarction is seen. There is cerebellar folial expansion consistent with atrophy. BRAINSTEM: No edema, hemorrhage, mass, or acute infarction is seen. There is commensurate atrophy.   CALVARIUM: There is no apparent depressed frac major discrepancies. Impression:   Mr Briana Zapata is a 40 y/o male that presented with bruising, noted to have pancytopenia with significant TCP (9). Patient with personal hx of blindness and etoh abuse.  He had a seizure overnight thought to be 2/2 wi

## 2018-02-07 NOTE — PROGRESS NOTES
Maria G Higgins is a 41year old  male who is legally blind with history of chronic alcohol dependency status post seizure withdrawal who presented to the hospital with thrombocytopenia and escalated into delirium tremors.    The patient seen today fo Packs/day: 0.00      Years: 20.00        Types: Cigars  Smokeless tobacco: Current User                       Types: Chew  Alcohol use:  Yes              Comment: per pt 2 drinks daily        Medications (Active prior to today's visit):    Current Facility- 02/06/2018   CREATSERUM 1.24 02/06/2018   BUN 14 02/06/2018    02/06/2018   K 4.3 02/06/2018    02/06/2018   CO2 22 02/06/2018    (H) 02/06/2018   CA 9.7 02/06/2018   ALB 3.1 (L) 02/06/2018   ALKPHO 101 (H) 02/06/2018   TP 7.5 02/06/2018 CONSULT      Prothrombin Time (PT)      PTT, Activated      Magnesium      Potassium      CBC With Differential With Platelet      Basic Metabolic Panel (8)      Magnesium      CBC With Differential With Platelet      Ethyl Alcohol      Hepatitis Panel, Ac

## 2018-02-07 NOTE — OCCUPATIONAL THERAPY NOTE
Attempted to see PT in PM for OT treatment session. Pt is scheduled for colonoscopy today. Will follow up when appropriate    . Karli Leong, OTR/L   [facility]

## 2018-02-08 VITALS
RESPIRATION RATE: 14 BRPM | BODY MASS INDEX: 22.98 KG/M2 | OXYGEN SATURATION: 99 % | HEIGHT: 71 IN | SYSTOLIC BLOOD PRESSURE: 119 MMHG | TEMPERATURE: 98 F | WEIGHT: 164.13 LBS | DIASTOLIC BLOOD PRESSURE: 76 MMHG | HEART RATE: 92 BPM

## 2018-02-08 LAB
ANION GAP SERPL CALC-SCNC: 13 MMOL/L (ref 0–18)
BUN SERPL-MCNC: 13 MG/DL (ref 8–20)
BUN/CREAT SERPL: 9.9 (ref 10–20)
CALCIUM SERPL-MCNC: 9.7 MG/DL (ref 8.5–10.5)
CHLORIDE SERPL-SCNC: 101 MMOL/L (ref 95–110)
CO2 SERPL-SCNC: 26 MMOL/L (ref 22–32)
CREAT SERPL-MCNC: 1.31 MG/DL (ref 0.5–1.5)
ERYTHROCYTE [DISTWIDTH] IN BLOOD BY AUTOMATED COUNT: 18.9 % (ref 11–15)
GLUCOSE SERPL-MCNC: 79 MG/DL (ref 70–99)
HCT VFR BLD AUTO: 33.1 % (ref 41–52)
HGB BLD-MCNC: 10.8 G/DL (ref 13.5–17.5)
MAGNESIUM SERPL-MCNC: 1.5 MG/DL (ref 1.8–2.5)
MCH RBC QN AUTO: 27.4 PG (ref 27–32)
MCHC RBC AUTO-ENTMCNC: 32.7 G/DL (ref 32–37)
MCV RBC AUTO: 83.9 FL (ref 80–100)
OSMOLALITY UR CALC.SUM OF ELEC: 289 MOSM/KG (ref 275–295)
PLATELET # BLD AUTO: 97 K/UL (ref 140–400)
PMV BLD AUTO: 10.6 FL (ref 7.4–10.3)
POTASSIUM SERPL-SCNC: 3.7 MMOL/L (ref 3.3–5.1)
RBC # BLD AUTO: 3.94 M/UL (ref 4.5–5.9)
SODIUM SERPL-SCNC: 140 MMOL/L (ref 136–144)
WBC # BLD AUTO: 4.7 K/UL (ref 4–11)

## 2018-02-08 PROCEDURE — 99233 SBSQ HOSP IP/OBS HIGH 50: CPT | Performed by: OTHER

## 2018-02-08 RX ORDER — MAGNESIUM OXIDE 400 MG (241.3 MG MAGNESIUM) TABLET
800 TABLET ONCE
Status: COMPLETED | OUTPATIENT
Start: 2018-02-08 | End: 2018-02-08

## 2018-02-08 RX ORDER — QUETIAPINE 50 MG/1
50 TABLET, FILM COATED ORAL NIGHTLY
Qty: 30 TABLET | Refills: 0 | Status: SHIPPED | OUTPATIENT
Start: 2018-02-08 | End: 2018-03-13 | Stop reason: DRUGHIGH

## 2018-02-08 RX ORDER — VENLAFAXINE HYDROCHLORIDE 75 MG/1
75 CAPSULE, EXTENDED RELEASE ORAL DAILY
Status: DISCONTINUED | OUTPATIENT
Start: 2018-02-09 | End: 2018-02-08

## 2018-02-08 RX ORDER — MELATONIN
100 DAILY
Qty: 30 TABLET | Refills: 0 | Status: SHIPPED | OUTPATIENT
Start: 2018-02-09 | End: 2018-03-05

## 2018-02-08 RX ORDER — POTASSIUM CHLORIDE 20 MEQ/1
40 TABLET, EXTENDED RELEASE ORAL ONCE
Status: COMPLETED | OUTPATIENT
Start: 2018-02-08 | End: 2018-02-08

## 2018-02-08 RX ORDER — VENLAFAXINE HYDROCHLORIDE 75 MG/1
75 CAPSULE, EXTENDED RELEASE ORAL DAILY
Qty: 30 CAPSULE | Refills: 0 | Status: SHIPPED | OUTPATIENT
Start: 2018-02-09 | End: 2018-03-13 | Stop reason: DRUGHIGH

## 2018-02-08 RX ORDER — VENLAFAXINE HYDROCHLORIDE 37.5 MG/1
37.5 CAPSULE, EXTENDED RELEASE ORAL DAILY
Qty: 30 CAPSULE | Refills: 0 | Status: SHIPPED | OUTPATIENT
Start: 2018-02-09 | End: 2018-02-08

## 2018-02-08 RX ORDER — POLYETHYLENE GLYCOL 3350 17 G/17G
17 POWDER, FOR SOLUTION ORAL DAILY PRN
Qty: 7 EACH | Refills: 0 | Status: SHIPPED | OUTPATIENT
Start: 2018-02-08 | End: 2018-03-05

## 2018-02-08 RX ORDER — PANTOPRAZOLE SODIUM 40 MG/1
40 TABLET, DELAYED RELEASE ORAL
Qty: 30 TABLET | Refills: 0 | Status: SHIPPED | OUTPATIENT
Start: 2018-02-08 | End: 2018-07-25 | Stop reason: CLARIF

## 2018-02-08 RX ORDER — ACETAMINOPHEN 325 MG/1
650 TABLET ORAL EVERY 4 HOURS PRN
Qty: 30 TABLET | Refills: 0 | Status: SHIPPED | OUTPATIENT
Start: 2018-02-08 | End: 2018-07-25

## 2018-02-08 RX ORDER — LIDOCAINE 50 MG/G
1 PATCH TOPICAL EVERY 24 HOURS
Qty: 7 PATCH | Refills: 0 | Status: SHIPPED | OUTPATIENT
Start: 2018-02-08 | End: 2018-07-25

## 2018-02-08 NOTE — PROGRESS NOTES
[facility]  Gastroenterology Progress Note    Moni Keith Patient Status:  Inpatient    1970 MRN [id number]   Location [facility] 5SW/SE Attending Michael Shaikh MD   Hosp Day # 10 PCP Krystina Valdes MD     Subjective:  THE [facility]

## 2018-02-08 NOTE — CM/SW NOTE
RN told SW that pt is cleared to discharge today and will need medicar transport.  SW spoke w/ Junior Junior from [address] [place] [address] and arranged [employer] transport [facility] [facility].     SUSANNA spoke w/ pt's sister, Nory Fields and explained [employer] fees; agreeable w/

## 2018-02-08 NOTE — PLAN OF CARE
HEMATOLOGIC - ADULT    • Maintains hematologic stability Completed    • Free from bleeding injury Completed        Impaired Swallowing    • Minimize aspiration risk Completed        MUSCULOSKELETAL - ADULT    • Return mobility to safest level of function C

## 2018-02-08 NOTE — PLAN OF CARE
Problem: Patient Centered Care  Goal: Patient preferences are identified and integrated in the patient's plan of care  Interventions:  - What would you like us to know as we care for you?  Patient is blind, can get confused when he wakes up and doesn't see function  INTERVENTIONS:  - Assess patient stability and activity tolerance for standing, transferring and ambulating w/ or w/o assistive devices  - Assist with transfers and ambulation using safe patient handling equipment as needed  - Advance activity as Progressing

## 2018-02-08 NOTE — PROGRESS NOTES
[facility] AND [facility] Infectious Disease Progress Note    Josefina Esqueda Patient Status:  Inpatient    1970 MRN [id number]   Location [facility] 5SW/SE Attending Ifrah Lemon MD   Hosp Day # 10 PCP Vilma Musa MD     Subjective:  Rosie Syed magnesium hydroxide (MILK OF MAGNESIA) 400 MG/5ML suspension 30 mL, 30 mL, Oral, Daily PRN  •  bisacodyl (DULCOLAX) rectal suppository 10 mg, 10 mg, Rectal, Daily PRN  •  FLEET ENEMA (FLEET) 7-19 GM/118ML enema 133 mL, 1 enema, Rectal, Once PRN  •  ondans no obvious issues  - 2D echo with MV vegetation, MYRA without obvious vegetation - thought that MV findings may be chordae  - IV vancomycin and ceftriaxone ongoing - will need at least 4 weeks of IV Rx with streamline to ceftriaxone alone soon  - Some hemor

## 2018-02-08 NOTE — DISCHARGE SUMMARY
[facility] Hospitalist Discharge Summary   Patient ID:  Maria G Higgins  [id number]  41 year old  4/12/1970    Admit date: 1/29/2018  Discharge date: 2/8/2018    Primary Care Physician: Jake Howe MD   Attending Physician: Elysia Higgins MD   Consults:   Haja Verdin ETOH abuse, he states he is only drinking 2 alcoholic drinks per day. He denies CP, SOB, abdominal pain, N/V. Overnight pt had seizure with associated tongue trauma. He does not recall events.      Hospital Course:     Mr. Sondra Gurrola is a 42 yo male with xray negative  -MYRA with No evidence of vegetation on mitral valve but noted Focal calcification of chordaetendinae which is concerning for possible endocarditis  -ceftriaxone, vanco, will need IV abx on discharge-at least 4 weeks per IV of ceftriaxone wit + in ER  -protonix  -EGD/Colon-3 colon polyps, diverticulosis, internal hemorrhoids, portal hypertensive gastropathy, grade 1 esophageal varix  -reinforced no ETOH  -follow GI prn      Hypokalemia/Hypomagnesemia  -repleted via protocol     EXAM:   GENERAL: medication with the same name was removed. Continue taking this medication, and follow the directions you see here. CONTINUE taking these medications    folic acid 1 MG Tabs  Commonly known as:  FOLVITE  Take 1 tablet (1 mg total) by mouth daily. change    Total Time Coordinating Care: greater  than 30 minutes    Patient had opportunity to ask questions and state understand and agree with therapeutic plan as outlined    Murray Saleem RN, NP   [facility] Hospitalist Team  Pager [phone]  Shabana Waterman commands and answers questions appropriately  Psych: Affect- normal  SKIN: warm, dry, large ecchymoses over R anterior hip and R lower jaw, also with multiple scrapes/scratches on R forearm and R hip  EXT: no edema     Assessment/Plan     ETOH abuse with W a day, ETOH level pending, CIWA protocol, will change to po meds once cleared by speech  -per neurology, provoked sz, due to chronic alcohol abuse and withdrawal, no antiepileptic meds needed  -appreciate neurology input     Hematuria- resolved   -due to f

## 2018-02-08 NOTE — BH PROGRESS NOTE
Behavioral Health SOAP Note  SUBJECTIVE           Patient is a 40 y/o male admitted with a h/o etoh withdrawal. Yogesh Hanks has a h/o prior admissions for etoh withdrawal seizures. This writer met with the patient at bedside today.   Patient is alert and orien

## 2018-02-09 NOTE — PROGRESS NOTES
Jing Robertson is a 41year old  male who is legally blind with history of chronic alcohol dependency status post seizure withdrawal who presented to the hospital with thrombocytopenia and escalated into delirium tremors.    The patient seen today fo Father    • Cancer Mother    • smoker Uday Brown Mother    • alzheimers Uday Brown Mother       Social History: Smoking status: Current Every Day Smoker                                                   Packs/day: 0.00      Years: 20.00        Types: Cigars  Smo current symptom and severity. At this point, I would recommend the following approach:      1. Focus on education and support. 2.  Discontinue Valium. 3.  Continue Seroquel 50 mg nightly. 4.  Increase Effexor XR to 75 mg every morning.   5.  Continue Se Visit:    Signed Prescriptions Disp Refills    acetaminophen 325 MG Oral Tab 30 tablet 0      Sig: Take 2 tablets (650 mg total) by mouth every 4 (four) hours as needed.       QUEtiapine Fumarate 50 MG Oral Tab 30 tablet 0      Sig: Take 1 tablet (50 mg tot

## 2018-02-19 ENCOUNTER — HOSPITAL ENCOUNTER (OUTPATIENT)
Dept: MRI IMAGING | Facility: HOSPITAL | Age: 48
Discharge: HOME | End: 2018-02-19
Attending: INTERNAL MEDICINE
Payer: MEDICARE

## 2018-02-19 DIAGNOSIS — M48.00 SPINAL STENOSIS: ICD-10-CM

## 2018-02-19 PROCEDURE — 72148 MRI LUMBAR SPINE W/O DYE: CPT | Performed by: INTERNAL MEDICINE

## 2018-03-05 PROBLEM — I33.0 SBE (SUBACUTE BACTERIAL ENDOCARDITIS): Status: ACTIVE | Noted: 2018-03-05

## 2018-03-05 PROBLEM — F10.20 ALCOHOLISM (HCC): Status: ACTIVE | Noted: 2018-03-05

## 2018-03-12 PROBLEM — M47.817 LUMBOSACRAL SPONDYLOSIS WITHOUT MYELOPATHY: Status: ACTIVE | Noted: 2018-03-12

## 2018-03-20 PROBLEM — M54.50 CHRONIC BILATERAL LOW BACK PAIN WITHOUT SCIATICA: Status: ACTIVE | Noted: 2018-03-20

## 2018-03-20 PROBLEM — G89.29 CHRONIC BILATERAL LOW BACK PAIN WITHOUT SCIATICA: Status: ACTIVE | Noted: 2018-03-20

## 2018-10-25 PROBLEM — E87.6 HYPOKALEMIA: Status: ACTIVE | Noted: 2018-01-01

## 2018-10-25 PROBLEM — E87.1 HYPONATREMIA: Status: ACTIVE | Noted: 2018-01-01

## 2018-10-25 PROBLEM — R73.9 HYPERGLYCEMIA: Status: ACTIVE | Noted: 2018-01-01

## 2018-10-25 PROBLEM — S82.892A CLOSED FRACTURE OF LEFT ANKLE: Status: ACTIVE | Noted: 2018-01-01

## 2018-10-25 PROBLEM — S82.892A CLOSED FRACTURE OF LEFT ANKLE, INITIAL ENCOUNTER: Status: ACTIVE | Noted: 2018-01-01

## 2018-10-25 PROBLEM — D64.9 ANEMIA: Status: ACTIVE | Noted: 2018-01-01

## 2018-10-25 PROBLEM — F10.10 ALCOHOL ABUSE: Status: ACTIVE | Noted: 2018-01-01

## 2018-10-25 PROBLEM — S09.90XA INJURY OF HEAD, INITIAL ENCOUNTER: Status: ACTIVE | Noted: 2018-01-01

## 2018-10-25 NOTE — H&P
[facility] Hospitalist Team  History and Physical     ASSESSMENT / PLAN:   Mr. Moni Keith is a 40 yo male with hx blindness, HTN, ETOH abuse with hx of withdrawal ass with sz and DT, chronic thrombocytopenia,  HL-not on meds  presumptive Subacute bacterial endo consult    Mood Disorder  -was previously placed on seroquel and effexor per psych during admission 1/2018, now taking effexor, will contineu     Portal Hypertensive Gastropathy/Grade 1 Esophageal Varices/Hepatic Steatosis/Hepaospenomegaly   -elevated AST often as he is legally blind since birth. He drinks only on weekends-6 beers. Tells me he last drank this weekend. He is upset at on of his siblings as she took his dog away this weekend and asks me only to update his sister Michael Vargas only and not Eva Higginbotham.  He denie [Prometha*         Home Medications:    No outpatient medications have been marked as taking for the 10/25/18 encounter [facility] Encounter).     Soc Hx  Social History    Tobacco Use      Smoking status: Current Every Day Smoker        Years: 20.00 Mild chronic microvascular ischemic disease, advanced for patient's age. Bilateral phthisis bulbi. No acute intracranial abnormality.     Dictated by (CST): Alayna Shi MD on 10/25/2018 at 9:37     Approved by (CST): Alayna Shi MD on 10/25/2018 at 9:42 clear, normal respiratory effort   CV: Heart with regular rate and rhythm   Abd: Abdomen soft, nontender, nondistended   MSK:  no clubbing, no cyanosis.   No Lower extremity edema  Skin: no rashes or lesions, well perfused  Psych: mood stable, cooperative hypertensive gastropathy, grade 1 esophageal varix  -continue to reinforce no ETOH      Rest as above with above

## 2018-10-25 NOTE — WOUND PROGRESS NOTE
WOUND CARE NOTE      PLAN   Recommendations:  Dietary consult for recommendations for nutrition to optimize wound healing    Wound(s)  Location: RIGHT FACE  Cleansing  Wound Cleanser  Dressings XEROFORM & 4X4  Secure with TEGADERM  Frequency DAILY     GILMORE

## 2018-10-25 NOTE — CONSULTS
10/25/2018  Dontae Huizar  4/12/1970  57year old   male  [place]    HPI:   Patient presents with:  Fall (musculoskeletal, neurologic)      Date of injury/ onset of symptoms: 2 days ago after a fall  The patient complains of pain located left ankle.   The p 3/12/2018    Performed by Timothy Gutierrez DO at [facility]      Family History   Problem Relation Age of Onset   • Hypertension Father    • Cancer Mother    • Other (smoker) Mother    • Other (alzheimers) Mother       Social History    [facility] PLT 28 10/25/2018    CREATSERUM 0.78 10/25/2018    BUN 10 10/25/2018     10/25/2018    K 3.2 10/25/2018    CL 87 10/25/2018    CO2 22 10/25/2018     10/25/2018    CA 9.3 10/25/2018    ALB 4.0 10/25/2018    ALKPHO 94 10/25/2018    BILT 3.7 10/2

## 2018-10-25 NOTE — ED PROVIDER NOTES
Patient Seen in: [facility] Emergency Department    History   Patient presents with:  Fall (musculoskeletal, neurologic)    Stated Complaint: fall 24 hours ago. HPI    Patient presents the emergency department complaining of left ankle pain.   H Comment: once every 9 months when glucoma acts up, last taken in May      Review of Systems    Positive for stated complaint: fall 24 hours ago. Other systems are as noted in HPI. Constitutional and vital signs reviewed.       All other systems reviewed a Notable for the following components:       Result Value    Glucose 117 (*)     Sodium 127 (*)     Potassium 3.2 (*)     Chloride 87 (*)     Calculated Osmolality 264 (*)     All other components within normal limits   HEPATIC FUNCTION PANEL (7) - Abnormal RAINBOW DRAW BLUE   RAINBOW DRAW LAVENDER   RAINBOW DRAW DARK GREEN   RAINBOW DRAW LIGHT GREEN   RAINBOW DRAW GOLD   RAINBOW DRAW LAVENDER TALL (BNP)     EKG    Rate, intervals and axes as noted on EKG Report.   Rate: 92 bpm  Rhythm: Sinus Rhythm  Reading 9:42          Xr Chest Ap Portable  (cpt=71045)    Result Date: 10/25/2018  CONCLUSION:  1. No acute cardiopulmonary disease. 2. No significant change has occurred.        Dictated by (CST): Fang Rodriguez MD on 10/25/2018 at 10:38     Approved by (CST):

## 2018-10-25 NOTE — PROGRESS NOTES
Patient transferred to PCU for closer monitoring. Transported by this RN down to room 223. Report called to PCU RN. He will call RN if he has any additional questions.

## 2018-10-25 NOTE — ED INITIAL ASSESSMENT (HPI)
Pt arrives via ems from home for a fall 24 hours ago. Pt is blind and states he tripped over his shoes. Pt denies loc and denies head injury. Pt c/o left ankle pain and swelling. Left ankle deformity, splinted by medics.   Pt has numerous bruises all ov

## 2018-10-25 NOTE — BH PROGRESS NOTE
Behavioral Health Note  CHIEF COMPLAINT  Fall  REASON FOR ADMISSION  Fall with left medial and distal fibula fa and etoh withdrawal.   SOCIAL HISTORY  Italia Rodriguez lives alone and is on disability.   He smokes, has a h/o drug use and drinks alcohol on the weekend appropriate to discuss outpatient treatment.    MENTAL STATUS EXAM    Appearance: disheveled, laying in bed, multiple bruises, eyes closed (patient is blind)  Attitude/Coorperation: receptive, coorperative  Behavior: restless, fidgety, sweating, tremors  Sp

## 2018-10-26 NOTE — PHYSICAL THERAPY NOTE
Attempted to see patient for physical therapy evaluation. Per RN, patient not appropriate, on Ativan drip. Will attempt to see patient tomorrow for physical therapy evaluation as time permits and as patient is appropriate. RN aware.

## 2018-10-26 NOTE — CONSULTS
Pulmonary Consult     Assessment / Plan:  1. Alcohol withdrawal  - start Librium  - wean ativan gtt  - MVI, thiamine, folic acid  2. Left ankle fracture  - per ortho  3. Hyponatremia - improving  - IVFs  4. TCP - chronic  - monitor  5.  Mood disorder  - psy time   Cholecalciferol (VITAMIN D3) 34823 units Oral Cap Take 1,000 capsules by mouth daily. Disp:  Rfl:  More than a month at Unknown time   Multiple Vitamins-Minerals (MULTI-VITAMIN/MINERALS) Oral Tab Take 1 tablet by mouth daily.  Disp:  Rfl:  More than Height:         General: NAD, +facial bruising  HEENT: OP clear  Respiratory: clear breath sounds bilaterally  Cardiovascular: RRR, no MRG  Abdo: soft, NTND  Ext: LLE cast  Mental status: confused    Labs:  Reviewed in EMR    Inpatient Medications:  Revi

## 2018-10-26 NOTE — PLAN OF CARE
CARDIOVASCULAR - ADULT    • Maintains optimal cardiac output and hemodynamic stability Not Progressing        NEUROLOGICAL - ADULT    • Achieves stable or improved neurological status Not Progressing    • Achieves maximal functionality and self care Not Pr

## 2018-10-26 NOTE — CM/SW NOTE
10/26: Received MDO for Pt is blind, lives alone, has family in [facility], drinks on weekends, multiple falls, will have surgical repair of fx ankle. PT/OT unable to assess due to Ativan drip.  He has a h/o alcohol abuse & seen by Calin Tovar psych liaiso

## 2018-10-26 NOTE — PLAN OF CARE
Safety Risk - Non-Violent Restraints    • Patient will remain free from self-harm Not Progressing          Safety Risk - Non-Violent Restraints    • Patient will remain free from self-harm Not Progressing        Pt going through etoh withdrawal, does not f

## 2018-10-26 NOTE — PROGRESS NOTES
[facility] Hospitalist Team  Progress Note    Sivan Alves Patient Status:  Inpatient    1970 MRN [id number]   Location [facility] 2W/SW Attending Hetal Argueta MD   Hosp Day # 1 PCP Emanuel Lowry MD     CC: Follow Up  PCP: MD Arsalan Cordoba 127-->131  -IVF  -follow     Hypokalemia/Hypomagnesemia  -replete via protocol     Acute on Chronic Thrombocytopenia-Improved   -hx of in past, last outpt platelet count 69 2/1262, has seen heme with last admission when platelets were 9, attributed to infe taking dog out and allowing dog to soul in house. She is aware of game plan.     Spoke with Dr Regi Osborne who plans on doing a stage procedure with pt, she would ideally like to place bone back in place today to prevent skin breakdown with more extensive surg LORazepam (ATIVAN) injection 2 mg 2 mg Intravenous Q1H PRN   Metoclopramide HCl (REGLAN) injection 10 mg 10 mg Intravenous Q8H PRN   INFUVITE ADULT (INFUVITE) 10 mL, Thiamine HCl 738 mg, folic acid (FOLVITE) 1 mg in dextrose 5 % 1,000 mL infusion  Arlen Moore Dictated by (CST): Rama Akins MD on 10/25/2018 at 9:37     Approved by (CST): Rama Akins MD on 10/25/2018 at 9:42          Ct Facial Bones (cpt=70486)    Result Date: 10/25/2018  CONCLUSION:   Right facial soft tissue swelling and laceration.  No radiop and laceration  -xray left ankle- Comminuted fractures of the medial malleolus and distal fibula as discussed above with partial anterior dislocation of the distal tibia in relation to the talar dome.  Widening of the medial tibiotalar joint with ankle inst

## 2018-10-26 NOTE — OCCUPATIONAL THERAPY NOTE
Chart reviewed for OT evaluation. Patient with agitation and ETOH w/d. Per RN patient is not appropriate for evaluation today. Will follow up 10/27.

## 2018-10-27 NOTE — CONSULTS
[facility]    Report of Consultation    Sondra Gurrola Patient Status:  Inpatient    1970 MRN [id number]   Location [facility] 2W/SW Attending Lisseth Soares MD   Hosp Day # 1 PCP Ellyn Oh MD     Date of Admission:  10/25 agitated last night and combative pulling his IVs.    [facility] reports that he drinks only on the weekends, but he does not appear to be a reliable historian. He does report drinking a case of beer and a handle of whisky at his highest point.   He denies ej tobacco: Current User        Types: Chew    Alcohol use: Yes      Comment: per pt, only on weekends    Drug use: Yes      Types: Cannabis      Comment: once every 9 months when glucoma acts up, last taken in May          Current Medications:    Current Fac Tab Take 1 tablet by mouth daily. folic acid 1 MG Oral Tab Take 1 tablet (1 mg total) by mouth daily.        Allergies    Phenergan [Prometha*          Review of Systems:     As by Admitting/Attending    Results:     Laboratory Data:  Lab Results   Compon 10/25/2018 at 9:42          Ct Facial Bones (cpt=70486)    Result Date: 10/25/2018  CONCLUSION:   Right facial soft tissue swelling and laceration. No radiopaque foreign body, acute fracture, or drainable fluid collection.   Age advanced carotid artery athe approach:      1. Focus on education and support. 2.   Focus on detox process. Patient last time response better to Valium except limited supply IV and patient is abundant with a questionable swallow ability.   Continue Ativan 1–to 3 mg/h IV and utilize

## 2018-10-27 NOTE — PLAN OF CARE
NEUROLOGICAL - ADULT    • Achieves stable or improved neurological status Not Progressing          NEUROLOGICAL - ADULT    • Absence of seizures Progressing        RESPIRATORY - ADULT    • Achieves optimal ventilation and oxygenation Progressing        Saf

## 2018-10-27 NOTE — PROGRESS NOTES
Ashlie Dean Patient Status:  Inpatient    1970 MRN [id number]   Location [facility] 2W/SW Attending Obey Santamaria MD   Hosp Day # 2 PCP Latha Spaulding MD     Critical Care Progress Note      Assessment/Plan:  1.  Alcohol withdrawal  - Micheal  10/27/18   0430   RBC  2.66*   HGB  7.7*   HCT  23.9*   MCV  89.8   MCH  29.0   MCHC  32.3   RDW  17.6*   WBC  2.4*   PLT  48*     Recent Labs   Lab  10/25/18   0804  10/26/18   0432  10/27/18   0430   GLU  117*  92  104*   BUN  10  8  5*   CREATSERUM  0.7

## 2018-10-27 NOTE — PROGRESS NOTES
[facility] Hospitalist Team  Progress Note    Michelle Mallory Patient Status:  Inpatient    1970 MRN [id number]   Location [facility] 2W/SW Attending Tico Barajas MD   Hosp Day # 2 PCP Darell Oakes MD     CC: Follow Up  PCP: Darell Oakes MD    [place] Thrombocytopenia-Improved   -hx of in past, last outpt platelet count 69 5/8978, has seen heme with last admission when platelets were 9, attributed to infection and chronic ETOH.  Pt received platelets   -admission platelets 02-->4 WNIW-->14-->[id number] 1 distress  NEURO: sedated, mumbling  SKIN: right check with dressing, numerous bruises on body, extensive bruise to chin  RESP: non labored, CTA  CARDIO: Regular, no murmur  ABD: soft, NT, ND, BS+  EXTREMITIES: left leg with ace wrap and boot    DIAGNOSTIC Intravenous Q24H   venlafaxine (EFFEXOR-XR) 24 hr cap 150 mg Oral Daily   Normal Saline Flush 0.9 % injection 3 mL 3 mL Intravenous PRN   morphINE sulfate (PF) 2 MG/ML injection 1 mg 1 mg Intravenous Q2H PRN   Or      morphINE sulfate (PF) 2 MG/ML injectio 10/25/2018 at 9:33     Approved by (CST): Asya Johnson MD on 10/25/2018 at 9:42          Xr C-arm Fluoro >1 Hour  (cpt=76001)    Result Date: 10/27/2018  CONCLUSION:  1. Intraoperative fluoroscopy was utilized.      Dictated by (CST): Ron Alfred

## 2018-10-27 NOTE — PROGRESS NOTES
[facility]    Report of Consultation    Bernadette Locke Patient Status:  Inpatient    1970 MRN [id number]   Location [place] [address] Attending Rodriguez Doran MD   Hosp Day # 2 PCP Gopi Solis MD     Date of Admission: Types: Cigars      Smokeless tobacco: Current User        Types: Chew    Alcohol use: Yes      Comment: per pt, only on weekends    Drug use: Yes      Types: Cannabis      Comment: once every 9 months when glucoma acts up, last taken in May       Current M Hold] haloperidol lactate (HALDOL) 5 MG/ML injection 1 mg 1 mg Intramuscular Q2H PRN   [MAR Hold] QUEtiapine Fumarate (SEROQUEL) tab 50 mg 50 mg Oral Nightly   [MAR Hold] CloNIDine (CATAPRES) 0.1 MG/24HR 1 patch 1 patch Transdermal Weekly     Facility-Admi Impression:     Closed fracture of left ankle, initial encounter    This is a pleasant 70-year-old male that has multiple medical issues. He also has chronic thrombocytopenia.   The patient is a candidate for closed reduction of the left ankle with

## 2018-10-27 NOTE — ANESTHESIA PROCEDURE NOTES
Airway  Date/Time: 10/27/2018 7:40 AM  Urgency: elective      General Information and Staff    Patient location during procedure: OR  Anesthesiologist: Gary Dean MD  Performed: anesthesiologist     Indications and Patient Condition  Indications for

## 2018-10-27 NOTE — ANESTHESIA POSTPROCEDURE EVALUATION
Patient: Maria G Higgins    Procedure Summary     Date:  10/27/18 Room / Location:  [address] [place] [address] MAIN OR 05 / [facility] MAIN OR    Anesthesia Start:  6579 Anesthesia Stop:  0535    Procedure:  EXTREMITY LOWER CLOSED REDUCTION WITH PINNING (Left Ankle) Diagnosis:       Close

## 2018-10-27 NOTE — ANESTHESIA PREPROCEDURE EVALUATION
Anesthesia PreOp Note    HPI:     Jing Robertson is a 57year old male who presents for preoperative consultation requested by: Racheal Carrero MD    Date of Surgery: 10/25/2018 - 10/27/2018    Procedure(s):  EXTREMITY LOWER CLOSED REDUCTION WITH Tamiko Gault Alcohol withdrawal seizure, uncomplicated ([facility]         Date Noted: 12/24/2016        Past Medical History:   Diagnosis Date   • Alcoholism ([facility] 3/5/2018   • Back problem    • Blind    • Depression    • ETOH abuse    • Glaucoma    • High blood pressure Dexmedetomidine HCl (PRECEDEX) 400 mcg in sodium chloride 0.9 % 100 mL infusion 0.2-1.5 mcg/kg/hr Intravenous Continuous PRN lEsa Santacruz MD     [MAR Hold] Normal Saline Flush 0.9 % injection 10 mL 10 mL Intravenous PRN Heather Carlos MD 10 mL at 10/26/ LORazepam (ATIVAN) 40 mg in dextrose 5 % 200 mL infusion 1-3 mg/hr Intravenous Continuous Daniel King MD Last Rate: 12.5 mL/hr at 10/26/18 1715 2.5 mg/hr at 10/26/18 1715   [MAR Hold] haloperidol lactate (HALDOL) 5 MG/ML injection 1 mg 1 mg Intramusc HCT 23.9 (L) 10/27/2018    MCV 89.8 10/27/2018    MCH 29.0 10/27/2018    MCHC 32.3 10/27/2018    RDW 17.6 (H) 10/27/2018    PLT 48 (L) 10/27/2018    MPV 8.3 10/27/2018     Lab Results   Component Value Date     (L) 10/27/2018    K 3.0 (L) 10/27/2018 Airway:  LMA  Plan Comments: Per ICU nurse Mr. Rosario Daly did not have any seizures overnight and BP has been stable. He is NPO. Awake and able to answer questions appropriately.    Informed Consent Plan and Risks Discussed With:  Patient  Discussed plan with:

## 2018-10-27 NOTE — PLAN OF CARE
Problem: Patient Centered Care  Goal: Patient preferences are identified and integrated in the patient's plan of care  Interventions:  - What would you like us to know as we care for you? Blind, lives alone.  Sisters check on him  - Provide timely, complete indicated  - Manage/alleviate anxiety  - Monitor for signs/symptoms of CO2 retention  Outcome: Progressing  Remains on room air. Able to breath easily and without distress.     Problem: SKIN/TISSUE INTEGRITY - ADULT  Goal: Skin integrity remains intact  INT safe patient handling equipment as needed  - Ensure adequate protection for wounds/incisions during mobilization  - Obtain PT/OT consults as needed  - Advance activity as appropriate  - Communicate ordered activity level and limitations with patient/family

## 2018-10-27 NOTE — OPERATIVE REPORT
[facility]    [facility]   ATTENDING PHYSICIAN: Prosper Lucero MD   OPERATING PHYSICIAN: Mary Peña MD   PATIENT ACCOUNT#:   [id number]    LOCATION:  [address] [place] [address] RECORD #:   [id number]       DATE OF BIRTH:  [birth year] the ankle. Closed reduction was performed. Post reduction films were obtained and revealed improved alignment of the talus relative to the tibia. At this time, a short-leg posterior mold splint that was well padded was applied.   The splint was allowed t

## 2018-10-28 NOTE — PROGRESS NOTES
Ashlie Dean Patient Status:  Inpatient    1970 MRN [id number]   Location [facility] 2W/SW Attending Obey Santamaria MD   Hosp Day # 3 PCP Latha Spaulding MD     Critical Care Progress Note      Assessment/Plan:  1.  Alcohol withdrawal  - Micheal  10/28/18   0414   RBC  2.66*   HGB  8.0*   HCT  24.4*   MCV  91.7   MCH  30.0   MCHC  32.8   RDW  17.6*   WBC  3.1*   PLT  49*     Recent Labs   Lab  10/26/18   0432  10/27/18   0430  10/27/18   1850  10/28/18   0414   GLU  92  104*   --   104*   BUN  8  5

## 2018-10-28 NOTE — PROGRESS NOTES
[address] Patient Status:  Inpatient    1970 MRN [id number]   Location [facility] 2W/SW Attending Jennie Jimenez MD   Hosp Day # 3 PCP Gabe Adan MD         S:  Patient sleeping  O:  Blood pressure 120/88, pulse 89, t

## 2018-10-28 NOTE — PROGRESS NOTES
Pt increasingly agiated and confused on pms. attemts to get out of bed. Asking for dog food. Pulling out monitor leads with restraints in place. reoirented patient frequently. Ativan 2mg po per [facility] wihtout improvement. precedex restarted. Cont to [facility].

## 2018-10-28 NOTE — PROGRESS NOTES
[facility] Hospitalist Team  Progress Note    Du Mai Patient Status:  Inpatient    1970 MRN [id number]   Location [facility] 2W/SW Attending Reentta Lacy MD   Hosp Day # 3 PCP Karyn Zamora MD     CC: Follow Up  PCP: Karyn Zamora MD protocol     Acute on Chronic Thrombocytopenia-Improved   -hx of in past, last outpt platelet count 69 1/9156, has seen heme with last admission when platelets were 9, attributed to infection and chronic ETOH.  Pt received platelets   -admission platelets 2 resp. rate 12, height 5' 11\" (1.803 m), weight 161 lb 11.2 oz (73.3 kg), SpO2 100 %.     GENERAL: no apparent distress  NEURO: sedated, mumbling  SKIN: right check with dressing, numerous bruises on body, extensive bruise to chin  RESP: non labored, CTA  C 100 mL infusion 0.2-1.5 mcg/kg/hr Intravenous Continuous PRN   ferrous sulfate EC tab 325 mg 325 mg Oral BID with meals   diazepam (VALIUM) tab 10 mg 10 mg Oral BID   Normal Saline Flush 0.9 % injection 10 mL 10 mL Intravenous PRN   0.9%  NaCl infusion  In (CST): Aquilino Munoz MD on 10/27/2018 at 14:25          Xr C-arm Fluoro >1 Hour  (cpt=76001)    Result Date: 10/27/2018  CONCLUSION:  1. Intraoperative fluoroscopy was utilized.      Dictated by (CST): Lissette Orta MD on 10/27/2018 at 10:20     Ap

## 2018-10-28 NOTE — PLAN OF CARE
CARDIOVASCULAR - ADULT    • Maintains optimal cardiac output and hemodynamic stability Progressing    • Absence of cardiac arrhythmias or at baseline Progressing    Orthostatic hypotension while dangling with pt. Denies other symtoms.  Vs back to baseline i

## 2018-10-28 NOTE — OCCUPATIONAL THERAPY NOTE
OCCUPATIONAL THERAPY EVALUATION - INPATIENT      Room Number: 756/890-V  Evaluation Date: 10/28/2018  Type of Evaluation: Initial       Physician Order: IP Consult to Occupational Therapy  Reason for Therapy: ADL/IADL Dysfunction and Discharge Planning patient BP continued to drop with longer durations of static sitting (see below). Patient was returned back to supine and positioned for comfort; all needs in reach.      Patient is not safe to return home at this level; patient will likely require a GERRY st ENDOSCOPY   • EGD  02/2018   • ESOPHAGOGASTRODUODENOSCOPY (EGD) N/A 2/7/2018    Performed by Eliza Pepper MD at [facility] ENDOSCOPY   • EYE SURGERY  June 24 1974, July 1974   • LUMBAR / TRANSFORAMINAL EPIDURAL STEROID INJECTION N/A 3/12/2018    Performed by extremity ROM is within functional limits     STRENGTH ASSESSMENT  Upper extremity strength is within functional limits     ACTIVITIES OF DAILY LIVING ASSESSMENT  AM-PAC ‘6-Clicks’ Inpatient Daily Activity Short Form  How much help from another person does

## 2018-10-28 NOTE — PLAN OF CARE
Problem: Patient/Family Goals  Goal: Patient/Family Long Term Goal  Patient's Long Term Goal: Have ankle and mobility back to normal.    Interventions:  - impending surgery  - See additional Care Plan goals for specific interventions  Outcome: Progressing

## 2018-10-28 NOTE — PHYSICAL THERAPY NOTE
PHYSICAL THERAPY EVALUATION - INPATIENT     Room Number: 223/223-A  Evaluation Date: 10/28/2018  Type of Evaluation: New   Physician Order: PT Eval and Treat    Presenting Problem: (s/p fall getting OOB @ home, L ankle fx and closed reduction)  Reason for fracture of left ankle, initial encounter  Active Problems:    DTs (delirium tremens) (HCC)    Thrombocytopenia (HCC)    Anemia    Closed fracture of left ankle    Hyponatremia    Hypokalemia    Hyperglycemia    Injury of head, initial encounter    Alcohol 8  Location: L ankle  Management Techniques: (elevation)    COGNITION  · A&Ox3    RANGE OF MOTION AND STRENGTH ASSESSMENT  Upper extremity ROM:  See OT eval for assessment    Lower extremity ROM is within functional limits     Lower extremity strength is l decreased sitting balance, as well as hypotensive episode in sitting. Patient End of Session: In bed;Needs met;Call light within reach;RN aware of session/findings; All patient questions and concerns addressed    CURRENT GOALS    Goals to be met by: 11

## 2018-10-29 NOTE — PROGRESS NOTES
[facility]  Progress Note    Ruby Began Patient Status:  Inpatient    1970 MRN [id number]   Location [facility] [place] 2W/SW Attending Niki Lemus MD   Hosp Day # 4 PCP Yaakov Mart MD     SUBJECTIVE:  INTERVAL HISTORY:   57year old ma the left lower extremity  4. Continue medical management  5.  Plan for OR with Dr Edna Moise for ORIF once platelets elevated and patient stabilized medically    Margarita Valderrama PA-C  10/29/2018  10:41 AM

## 2018-10-29 NOTE — PROGRESS NOTES
Critical Care Progress Note     Assessment / Plan:  1. Alcohol withdrawal  - off precedex  - benzos prn  - MVI, thiamine, folic acid  - psych following  2. Left ankle fracture  - per ortho  3. Hyponatremia - improved  - IVFs  4.  TCP - chronic, uptrending

## 2018-10-29 NOTE — PLAN OF CARE
MUSCULOSKELETAL - ADULT    • Return mobility to safest level of function Not Progressing        NEUROLOGICAL - ADULT    • Achieves stable or improved neurological status Not Progressing    • Achieves maximal functionality and self care Not Progressing

## 2018-10-29 NOTE — OCCUPATIONAL THERAPY NOTE
OCCUPATIONAL THERAPY TREATMENT NOTE - INPATIENT        Room Number: 223/223-A                Problem List  Principal Problem:    Closed fracture of left ankle, initial encounter  Active Problems:    DTs (delirium tremens) ([facility])    Thrombocytopenia ([facility] session, pt up in chair with lunch, alarm set, all needs within reach, RN aware. DISCHARGE RECOMMENDATIONS     OT Device Recommendations: TBD     PLAN  OT Treatment Plan: Balance activities; ADL training;Functional transfer training; Endurance training;E in chair;Needs met;Call light within reach;RN aware of session/findings; Alarm set    OT Goals:     Patient self-stated goal is:no goals stated at this time     Patient will complete LE dressing with Min A   Comment: NT    Patient will complete toilet trans

## 2018-10-29 NOTE — PROGRESS NOTES
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 with a chronic history of hypertension, chronic back pain, alcohol dependency with the previous DTs and seizure withdrawal syndrome who presented to the hospital after a fall at home with closed fracture of l Performed by Katie Zapien MD at [facility] WITH PINNING Left 10/27/2018    Performed by Maritza Andersen MD at [address] [place] [address] OR   • EYE SURGERY  June 24 1974, July 1974   • LUMBAR / TRANSFORAMINAL EPIDURAL STEROI Intravenous Q2H PRN   Or      morphINE sulfate (PF) 2 MG/ML injection 2 mg 2 mg Intravenous Q2H PRN   QUEtiapine Fumarate (SEROQUEL) tab 50 mg 50 mg Oral Nightly   CloNIDine (CATAPRES) 0.1 MG/24HR 1 patch 1 patch Transdermal Weekly       Medications Prior (series 24, image 39; series 26, image 24). 3.  3 x 6 mm lenticular shaped calcification suggesting nondisplaced fracture versus osteochondral lesion within the posterior wall of the sinus tarsi arising from the calcaneus (series 23, image 33).    Dictated underlying psychosis by utilizing Seroquel 50 mg p.o. Nightly. 4.  Continue Effexor  mg every morning.         Orders This Visit:  Orders Placed This Encounter      CBC With Differential With Platelet      Basic Metabolic Panel (8)      Ethyl Alcohol

## 2018-10-29 NOTE — PROGRESS NOTES
[facility] Hospitalist Team  Progress Note    Jennifervance Saleem Patient Status:  Inpatient    1970 MRN [id number]   Location [facility] 2W/SW Attending Shavonne Short MD   Hosp Day # 4 PCP Donna Metz MD     CC: Follow Up  PCP: MD Lian Moore plts > 100 and cleared medically, will discuss with team  -appreciate ortho input     Hyponatremia-improving  -off IVF  -follow     Hypokalemia/Hypomagnesemia  -repleted via protocol     Acute on Chronic Thrombocytopenia-Improved   -hx of in past, last out will need rehab. OBJECTIVE:   Blood pressure 114/74, pulse 115, temperature 97.4 °F (36.3 °C), temperature source Oral, resp. rate 12, height 180.3 cm (5' 11\"), weight 161 lb 11.2 oz (73.3 kg), SpO2 99 %.     GENERAL: no apparent distress, overweight TraMADol HCl (ULTRAM) tab 50 mg 50 mg Oral Q6H PRN   diphenhydrAMINE (BENADRYL) cap/tab 25 mg 25 mg Oral Q6H PRN   ferrous sulfate EC tab 325 mg 325 mg Oral BID with meals   diazepam (VALIUM) tab 10 mg 10 mg Oral BID   LORazepam (ATIVAN) tab 1 mg 1 mg Or Intraoperative fluoroscopy was utilized.      Dictated by (CST): Yaya Haddad MD on 10/27/2018 at 10:20     Approved by (CST): Yaya Haddad MD on 10/27/2018 at 10:20              SEE ATTENDING NOTE BELOW:   Patient seen and examined in IVF  -follow     Hypokalemia/Hypomagnesemia  -repleted via protocol     Acute on Chronic Thrombocytopenia-Improved   -hx of in past, last outpt platelet count 69 5/9919, has seen heme with last admission when platelets were 9, attributed to infection and c

## 2018-10-29 NOTE — PHYSICAL THERAPY NOTE
PHYSICAL THERAPY TREATMENT NOTE - INPATIENT     Room Number: 805/837-S       Presenting Problem: (s/p fall getting OOB @ home, L ankle fx and closed reduction)    Problem List  Principal Problem:    Closed fracture of left ankle, initial encounter  Active tested    ACTIVITY TOLERANCE  Pulse: 115                      AM-PAC '6-Clicks' INPATIENT SHORT FORM - BASIC MOBILITY  How much difficulty does the patient currently have. ..  -   Turning over in bed (including adjusting bedclothes, sheets and blankets)?: A

## 2018-10-30 NOTE — OCCUPATIONAL THERAPY NOTE
OCCUPATIONAL THERAPY TREATMENT NOTE - INPATIENT        Room Number: 223/223-A                Problem List  Principal Problem:    Closed fracture of left ankle, initial encounter  Active Problems:    DTs (delirium tremens) ([facility])    Thrombocytopenia ([facility] [address] person does the patient currently need…  -   Putting on and taking off regular lower body clothing?: A Lot  -   Bathing (including washing, rinsing, drying)?: A Lot  -   Toileting, which includes using toilet, bedpan or urinal? : A Little  -   Putting on a

## 2018-10-30 NOTE — PROGRESS NOTES
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 with a chronic history of hypertension, chronic back pain, alcohol dependency with the previous DTs and seizure withdrawal syndrome who presented to the hospital after a fall at home with closed fracture of l 2/7/2018    Performed by Rigo May MD at [facility] ENDOSCOPY   • EGD  02/2018   • ESOPHAGOGASTRODUODENOSCOPY (EGD) N/A 2/7/2018    Performed by Rigo May MD at [address] [place] [address] 10/27/2018    Per venlafaxine (EFFEXOR-XR) 24 hr cap 150 mg Oral Daily   Normal Saline Flush 0.9 % injection 3 mL 3 mL Intravenous PRN   morphINE sulfate (PF) 2 MG/ML injection 1 mg 1 mg Intravenous Q2H PRN   QUEtiapine Fumarate (SEROQUEL) tab 50 mg 50 mg Oral Nightly denying any homicidal or suicidal ideation. Patient deny any auditory or visual hallucination. Judgment insight are improving. Impression:   Impression:     Delirium tremors.   Chronic alcohol dependency  Major depressive disorder recurrent moderate Screen Once      Prepare platelets Once      Prepare platelets Once      Prepare platelets Once      Prepare platelets Once      MRSA Screen by PCR Once      Meds This Visit:  Requested Prescriptions      No prescriptions requested or ordered in this encou

## 2018-10-30 NOTE — PLAN OF CARE
CARDIOVASCULAR - ADULT    • Maintains optimal cardiac output and hemodynamic stability Progressing    • Absence of cardiac arrhythmias or at baseline Progressing        Delirium    • Minimize duration of delirium Progressing        MUSCULOSKELETAL - ADULT

## 2018-10-30 NOTE — WOUND PROGRESS NOTE
Pt seen for follow up wound care. The laceration on the right cheek has epithelialized, small scab remains. No dressing is required at this time. The face has bruising present as does the back, bilateral legs, arms.

## 2018-10-30 NOTE — PROGRESS NOTES
[facility] Hospitalist Team  Progress Note    Eveline White Patient Status:  Inpatient    1970 MRN [id number]   Location [facility] 2W/SW Attending Gracy Amador MD   Hosp Day # 5 PCP Jordon Rocha MD     CC: Follow Up  PCP: Jordon Rocah MD    [facility] this Friday per conversation with Dr Dorian Christian, pt will need platelet count >302C  -prn pain meds  -appreciate ortho input     Hyponatremia  -off IVF  -follow     Hypokalemia/Hypomagnesemia  -repleted via protocol     Acute on Chronic Thrombocytopenia-Impro Improved pain to left foot. OBJECTIVE:   Blood pressure (!) 86/63, pulse 80, temperature 97.8 °F (36.6 °C), resp. rate 15, height 180.3 cm (5' 11\"), weight 161 lb 11.2 oz (73.3 kg), SpO2 98 %.     GENERAL: no apparent distress  NEURO: A/A Ox3  SKIN: fa 100 mg 100 mg Oral Daily   multivitamin (ADULT) tab 1 tablet 1 tablet Oral Daily   folic acid (FOLVITE) tab 1 mg 1 mg Oral Daily   Normal Saline Flush 0.9 % injection 10 mL 10 mL Intravenous PRN   TraMADol HCl (ULTRAM) tab 50 mg 50 mg Oral Q6H PRN   diphen with APN and agree with note above. S: patient feeling better this morning, denies chest pain or sob    objective  BP 93/69 (BP Location: Left arm)   Pulse 108   Temp 97.9 °F (36.6 °C) (Temporal)   Resp 20   Ht 5' 11\" (1.803 m)   Wt 161 lb 11.2 oz (73. attributed to infection and chronic ETOH.  Pt received platelets   -admission platelets 28, pt is now S/P 5 units of platelets, platelet count now 551 Per ortho platelets need to be >100K of OR  -US abdomen-mild hepatosplenomegaly with hepatic steatosis  -f

## 2018-10-31 NOTE — PLAN OF CARE
Problem: Patient/Family Goals  Goal: Patient/Family Long Term Goal  Patient's Long Term Goal: to go home    Interventions:  -monitor labs  -monitor VS  -follow MD recommendations  -take prescribed medications  - See additional Care Plan goals for specific effectiveness of antiarrhythmic and heart rate control medications as ordered  - Initiate emergency measures for life threatening arrhythmias  - Monitor electrolytes and administer replacement therapy as ordered  Outcome: Progressing      Problem: RESPIRAT medicated treatments as ordered  Outcome: Progressing      Problem: NEUROLOGICAL - ADULT  Goal: Achieves stable or improved neurological status  INTERVENTIONS  - Assess for and report changes in neurological status  - Initiate measures to prevent increased handling equipment as needed  - Ensure adequate protection for wounds/incisions during mobilization  - Obtain PT/OT consults as needed  - Advance activity as appropriate  - Communicate ordered activity level and limitations with patient/family  Outcome: Pr

## 2018-10-31 NOTE — PHYSICAL THERAPY NOTE
PHYSICAL THERAPY TREATMENT NOTE - INPATIENT     Room Number: 444/444-A       Presenting Problem: (s/p fall getting OOB @ home, L ankle fx and closed reduction)    Problem List  Principal Problem:    Closed fracture of left ankle, initial encounter  Active bed---NO AD , min to mod assist.    Pt with sit to stand transfers at EOB with RW . Pt stood at EOB min assist maintaining NON WB status for less then 1 minute. Sit to stand and standing tolerance activity completed two time  with min assist of one .   [p Static Sitting: Good  Dynamic Sitting: Good           Static Standing: Poor +  Dynamic Standing: Not tested    ACTIVITY TOLERANCE-pt denies symptoms       resting bP 108/68 HR 87 O2 sats to demonstrate transfers Sit to/from Stand at assistance level: moderate assistance with walker - rolling      Goal #2  Current Status Min A assist of one for sit to stand         Min to mod for squat pivot as above    Goal #3 Patient is able to [facility]

## 2018-11-01 NOTE — OCCUPATIONAL THERAPY NOTE
OCCUPATIONAL THERAPY TREATMENT NOTE - INPATIENT    Room Number: 444/444-A               Problem List  Principal Problem:    Closed fracture of left ankle, initial encounter  Active Problems:    DTs (delirium tremens) (HCC)    Thrombocytopenia (HCC)    Anem person does the patient currently need…  -   Putting on and taking off regular lower body clothing?: A Little  -   Bathing (including washing, rinsing, drying)?: A Little  -   Toileting, which includes using toilet, bedpan or urinal? : A Little  -   Puttin

## 2018-11-01 NOTE — CM/SW NOTE
SUSANNA met with the pt. At bedside to discuss rehab. The pt. Stated he is aware he is going to need rehab prior to returning home. The pt. Has a hx. Of rehab at [facility] and would be agreeable to going there again.   Referral made to [place] of

## 2018-11-01 NOTE — PROGRESS NOTES
[facility] Hospitalist Team  Progress Note    Yennifer Buenrostro Patient Status:  Inpatient    1970 MRN [id number]   Location [facility] 2W/SW Attending Zeynep Calderon MD   Hosp Day # 7 PCP Paola Rojo MD     CC: Follow Up  PCP: MD Anil Stallworth noted  -10/27 S/P closed reduction  -plans for ORIF this Friday per conversation with Dr Valentin Murphy, pt will need platelet count >044A  -prn pain med  -NWB  Left leg  -SW- will need rehab-referral placed to [facility]  -appreciate ortho input     Hyponatrem [phone]  Pager [phone]  11/1/2018    SUBJECTIVE:   Eating. Wants to get up with the walker and move more today     OBJECTIVE:   Blood pressure 109/60, pulse 73, temperature 97.6 °F (36.4 °C), temperature source Oral, resp.  rate 18, height 180.3 c rectal suppository 10 mg 10 mg Rectal Daily PRN   FLEET ENEMA (FLEET) 7-19 GM/118ML enema 133 mL 1 enema Rectal Once PRN   Heparin Sodium (Porcine) 5000 UNIT/ML injection 5,000 Units 5,000 Units Subcutaneous Q8H   Venlafaxine HCl ER (EFFEXOR-XR) 24 hr cap Moderate bimalleolar soft tissue swelling. See above. SEE ATTENDING NOTE BELOW:   Patient examined and assessed independently. Agree with above APN assessment. No overnight events. CIWA scores zero.  Wishing to ambulate more, no complaints at this

## 2018-11-01 NOTE — PROGRESS NOTES
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 8.  Follow-up during this admission.          Orders This Visit:  Orders Placed This Encounter      CBC With Differential With Platelet      Basic Metabolic Panel (8)      Ethyl Alcohol      Urinalysis with Culture Reflex Once      Prothromb

## 2018-11-01 NOTE — PHYSICAL THERAPY NOTE
PHYSICAL THERAPY TREATMENT NOTE - INPATIENT     Room Number: 444/444-A       Presenting Problem: (s/p fall getting OOB @ home, L ankle fx and closed reduction)    Problem List  Principal Problem:    Closed fracture of left ankle, initial encounter  Active for approx  1 minute each time. Sit to stand and standing tolerance activity completed two time  with min assist of one . Pt with normal BP response noted. Pt did appear as fatigue in standing with shaky right LE .    Pt can maintain NON WB status on le -    ACTIVITY TOLERANCE-pt denies symptoms       resting bP 127/77  HR 81 O2 sats 97 %   After activity BP  119/96  Pt keeps moving  HR 91   O2 sats 98  %              AM-PAC '6-Clicks' INPATIENT SHORT FORM - BASIC MOBILITY  How much difficulty does the pa assist for sit to stand from chair in room            Min to mod for squat pivot as above    Goal #3 Patient is able to ambulate 5 hops with assist device: walker - rolling at assistance level: minimum assistance   Goal #3   Current Status Not tested    Go

## 2018-11-02 NOTE — OPERATIVE REPORT
[facility]    PATIENT'S NAME: Hugo Fletcher PHYSICIAN: Vi Szymanski MD   OPERATING PHYSICIAN: Greta Amanda MD   PATIENT ACCOUNT#:   [id number]    LOCATION:  [facility] PACU [address] [place] [address]  MEDICAL RECORD #:   [id number]       DATE OF BIRTH: had an equinus contracture of approximately 20 degrees which corrected only to neutral.  So, we called out to speak with his sister, who has power of , and discussed performing a tendo-Achilles lengthening, and she expressed understanding and allow fixation. We turned our attention back to the syndesmosis.   There was widening of the syndesmosis noted on examination as well as under C-arm imaging, so we reduced the anterolateral avulsion fracture from the distal tibia, held this with K-wire for provi screw in standard mode through the tip of the plate. We obtained solid fixation, confirmed this under multiple C-arm images as well as on direct visualization. External rotation stress test was negative. The wounds were copiously irrigated.   We placed B

## 2018-11-02 NOTE — PLAN OF CARE
CARDIOVASCULAR - ADULT    • Maintains optimal cardiac output and hemodynamic stability Progressing    • Absence of cardiac arrhythmias or at baseline Progressing    Remote tele as ordered- NSR      MUSCULOSKELETAL - ADULT    • Return mobility to safest lev

## 2018-11-02 NOTE — ANESTHESIA POSTPROCEDURE EVALUATION
Patient: Ruby Conteh    Procedure Summary     Date:  11/02/18 Room / Location:  [address] [place] [address] MAIN OR 11 / [address] [place] [address] MAIN OR    Anesthesia Start:  4762 Anesthesia Stop:      Procedure:  ANKLE OPEN REDUCTION INTERNAL FIXATION (Left ) Diagnosis:       Closed fracture of le

## 2018-11-02 NOTE — PROGRESS NOTES
[facility] Hospitalist Team  Progress Note    Gordon Current Patient Status:  Inpatient    1970 MRN [id number]   Location [facility] 2W/SW Attending Jennie Jimenez MD   Hosp Day # 8 PCP Gabe Adan MD     CC: Follow Up  PCP: MD Anat Araujo Chronic ETOH Use/Abuse-Resolved  -hx of withdrawal with DT and Sz, needed precedex drip 1/2018 admission  -ETOH 1 on admission  -previous -folic acid and C65 normal  -off lorazapam drip since 10/27 and precedex drip since 10/29.  Off scheduled valium  - MVI Kath Brown RN, NP  Val [facility] Hospitalist Team  Office Number [phone]  Pager [phone]  11/2/2018    SUBJECTIVE:   Getting platelets for platelet count 55U. Ready for surgery. Sister Ann Ernst at [facility].  Pt has been accepted at [facility] fo Intravenous Once   [MAR Hold] diazepam (VALIUM) tab 5 mg 5 mg Oral Q8H PRN   [MAR Hold] Normal Saline Flush 0.9 % injection 10 mL 10 mL Intravenous PRN   [MAR Hold] docusate sodium (COLACE) cap 100 mg 100 mg Oral BID   [MAR Hold] PEG 3350 (MIRALAX) powder Questionable 3 mm   incomplete lucency through the lateral talar dome, and I cannot exclude an incomplete fracture. Correlate clinically and with possible followup CT scanning. Questionable posterior malleolar fracture.  Recommend confirmation with post red

## 2018-11-02 NOTE — PHYSICAL THERAPY NOTE
Chart reviewed    Pt is to have sx later today currently receiving platelets in prep for sx. PT will HOLD at this time . New order will be needed from ortho sx to reinitiate care .

## 2018-11-02 NOTE — PROGRESS NOTES
The patient today seen and evaluated. Patient has not been exhibiting symptom of withdrawal and he continually receiving Valium 5 mg 3 times daily. Otherwise the patient received naltrexone 25 mg yesterday with decrease in his Effexor XR to 75 mg.     Mikel Campos

## 2018-11-02 NOTE — ANESTHESIA PROCEDURE NOTES
ANESTHESIA INTUBATION  Urgency: elective    Airway not difficult    General Information and Staff    Patient location during procedure: OR  Anesthesiologist: Kan Prescott MD  Resident/CRNA: Letty Sandhoff, CRNA  Performed: anesthesiologist

## 2018-11-02 NOTE — PROGRESS NOTES
Platelets 78 this AM. Page sent out to MD on call for Dr Rosi Devlin. Awaiting response at this time. ---  [phone]: On call MD instructed to page Dr Rosi Devlin directly after 0800 regarding further orders. Will endorse to oncoming RN.

## 2018-11-02 NOTE — CM/SW NOTE
The pt. Has been accepted at [facility] when he is medically stable. [facility] will have a bed this weekend if the pt. Is medically stable. Updated info sent after surgery on 11/2.   SW confirmed with [facility] the PAS screen has

## 2018-11-02 NOTE — ANESTHESIA PREPROCEDURE EVALUATION
Anesthesia PreOp Note    HPI:     Cassie Huang is a 57year old male who presents for preoperative consultation requested by: Jen Sosa MD    Date of Surgery: 10/25/2018 - 11/2/2018    Procedure(s):  ANKLE OPEN REDUCTION INTERNAL FIXATION  Indicat Alcohol withdrawal seizure, uncomplicated ([facility]         Date Noted: 12/24/2016        Past Medical History:   Diagnosis Date   • Alcoholism ([facility] 3/5/2018   • Back problem    • Blind    • Depression    • ETOH abuse    • Glaucoma    • High blood pressure [MAR Hold] diazepam (VALIUM) tab 5 mg 5 mg Oral Q8H PRN Abigail Lynn MD    [facility] Hold] Normal Saline Flush 0.9 % injection 10 mL 10 mL Intravenous PRN LAUREN Gonzáles    [MAR Hold] docusate sodium (COLACE) cap 100 mg 100 mg Oral BID Crystal Aguirre [MAR Hold] Metoclopramide HCl (REGLAN) injection 10 mg 10 mg Intravenous Q8H PRN Abner Weinstein NP    [MAR Hold] Normal Saline Flush 0.9 % injection 3 mL 3 mL Intravenous PRN Nicole Weinstein NP 3 mL at 10/29/18 2016   [MAR Hold] morphINE sulfate (PF) 2 RDW 20.9 (H) 11/02/2018    PLT 78 (L) 11/02/2018    MPV 8.6 11/02/2018     Lab Results   Component Value Date     (L) 11/02/2018    K 3.8 11/02/2018     11/02/2018    CO2 23 11/02/2018    BUN 8 11/02/2018    CREATSERUM 0.57 11/02/2018    GLU 9 I have informed Racheal Blas and/or legal guardian or family member of the nature of the anesthetic plan, benefits, risks including possible dental damage if relevant, major complications, and any alternative forms of anesthetic management.    All of the pa

## 2018-11-02 NOTE — OCCUPATIONAL THERAPY NOTE
Pt scheduled for ankle orif later today. Will hold therapy at this time.  Will require new orders when appropriate for therapy

## 2018-11-03 NOTE — PHYSICAL THERAPY NOTE
PHYSICAL THERAPY EVALUATION - INPATIENT     Room Number: 083/668-T  Evaluation Date: 11/3/2018  Type of Evaluation: Re-evaluation   Physician Order: PT Eval and Treat    Presenting Problem: L ankle fx; closed reduction & splinting on 10/27; ORIF ankle & t is a 40 yo male with hx blindness, HTN, ETOH abuse with hx of withdrawal ass with sz and DT, chronic thrombocytopenia,  HL-not on meds  presumptive Subacute bacterial endocarditis with streptococcus mitus/oralis sepsis-completed ceftriaxone,  MSSA UTI, por [phone], July 1974   • LUMBAR / TRANSFORAMINAL EPIDURAL STEROID INJECTION N/A 3/12/2018    Performed by Greta Rodriguez DO at [address]  Type of Home: [facility]: (4 plex, with 1 stair in)  Stairs to Enter : to and from a bed to a chair (including a wheelchair)?: A Lot   -   Need to walk in hospital room?: A Lot   -   Climbing 3-5 steps with a railing?: Total     AM-PAC Score:  Raw Score: 14   PT Approx Degree of Impairment Score: 61.29%   Standardized Score (

## 2018-11-03 NOTE — OCCUPATIONAL THERAPY NOTE
OCCUPATIONAL THERAPY EVALUATION - INPATIENT      Room Number: 005/796-C  Evaluation Date: 11/3/2018  Type of Evaluation: Re-evaluation  Presenting Problem: L ankle ORIF s/p fall    Physician Order: IP Consult to Occupational Therapy  Reason for Therapy: AD Device Recommendations: TBD    PLAN  OT Treatment Plan: Balance activities; Energy conservation/work simplification techniques;ADL training;Functional transfer training;UE strengthening/ROM; Endurance training       OCCUPATIONAL THERAPY MEDICAL/SOCIAL HISTOR legally blind))          Drives: No  Patient Regularly Uses: Other (Comment)(cane)    Stairs in Home: 4 plex, 1 stair inside, 2 [place]  Assistive Device(s) Used: white cane for blindness    Prior Level of [place]:  Mod I with ADLs, uses visual impairment bedpan or urinal? : A Lot  -   Putting on and taking off regular upper body clothing?: A Little  -   Taking care of personal grooming such as brushing teeth?: A Little  -   Eating meals?: A Little    AM-PAC Score:  Score: 15  Approx Degree of Impairment: 5

## 2018-11-03 NOTE — PROGRESS NOTES
[facility] Hospitalist Progress Note     Reason for Admission:   PCP: Jordon Rocha MD     Assessment/Plan:     Principal Problem:    Closed fracture of left ankle, initial encounter  Active Problems:    DTs (delirium tremens) ([facility] [address]    Thrombocytopenia ([facility] [address] added  -SW/Psych liason for outpt resources  -appreciate psych input     Acute on Chronic Thrombocytopenia-Improved   Pancytopenia  -hx of in past, 2:2 bone marrow suppression with chronic EtOH use, hepatic dysfunction, spleenic sequestration  -HIV neg 2/2 11/3/2018 0801  Gross per 24 hour   Intake 2116.33 ml   Output 2860 ml   Net -743.67 ml       Last 3 Weights  10/27/18 0300 : 161 lb 11.2 oz (73.3 kg)  10/25/18 0758 : 160 lb (72.6 kg)  07/25/18 1003 : 175 lb (79.4 kg)  04/04/18 1310 : 175 lb (79.4 kg) chloride   Intravenous Once   • docusate sodium  100 mg Oral BID   • aspirin  325 mg Oral BID   • Venlafaxine HCl ER  75 mg Oral Daily   • Naltrexone HCl  25 mg Oral Daily   • Thiamine HCl  100 mg Oral Daily   • multivitamin  1 tablet Oral Daily   • folic

## 2018-11-03 NOTE — PROGRESS NOTES
Erika Enriquez Patient Status:  Inpatient    1970 MRN [id number]   Location [facility] 4W/SW/SE Attending Sanjuana Lara MD   Hosp Day # 9 PCP Paola Rojo MD     eYnnifer Buenrostro is a 57year old male patient.     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       Phenergan [Prometha*        Principal Problem:    Sunita

## 2018-11-04 NOTE — PROGRESS NOTES
Erika Enriquez Patient Status:  Inpatient    1970 MRN [id number]   Location [facility] 4W/SW/SE Attending Nury Worthy MD   Hosp Day # 8 PCP Caden Burgos MD     Ronda Lopez is a 57year old male patient. 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

## 2018-11-04 NOTE — PROGRESS NOTES
[facility] Hospitalist Progress Note     Reason for Admission:   PCP: Gopi Solis MD     Assessment/Plan:     Principal Problem:    Closed fracture of left ankle, initial encounter  Active Problems:    DTs (delirium tremens) ([facility] [address]    Thrombocytopenia ([facility] [address] folic acid   -naltrexone added  -SW/Psych liason for outpt resources  -appreciate psych input     Acute on Chronic Thrombocytopenia-Improved   Pancytopenia  -hx of in past, 2:2 bone marrow suppression with chronic EtOH use, hepatic dysfunction, spleenic se 108/67      Intake/Output:    Intake/Output Summary (Last 24 hours) at 11/4/2018 0929  Last data filed at 11/4/2018 0700  Gross per 24 hour   Intake 890 ml   Output 2700 ml   Net -1810 ml       Last 3 Weights  10/27/18 0300 : 161 lb 11.2 oz (73.3 kg)  10/2 on 11/02/2018 at 15:01              Meds:     • [MAR Hold] sodium chloride   Intravenous Once   • docusate sodium  100 mg Oral BID   • aspirin  325 mg Oral BID   • Venlafaxine HCl ER  75 mg Oral Daily   • Naltrexone HCl  25 mg Oral Daily   • Thiamine HCl

## 2018-11-04 NOTE — PHYSICAL THERAPY NOTE
PHYSICAL THERAPY TREATMENT NOTE - INPATIENT     Room Number: 990/666-V       Presenting Problem: L ankle fx; closed reduction & splinting on 10/27; ORIF ankle & tendoachilles lengthening 11/2    Problem List  Principal Problem:    Closed fracture of left a Sitting: Good  Dynamic Sitting: Fair +           Static Standing: Fair -  Dynamic Standing: Poor +    ACTIVITY TOLERANCE                         O2 WALK                  AM-PAC '6-Clicks' INPATIENT SHORT FORM - BASIC MOBILITY  How much difficulty does the able to ambulate 15 feet with assist device: walker - rolling at assistance level: minimum assistance   Goal #4   Current Status Amb 36' with the RW mod A   Goal #5 Patient to demonstrate independence with home activity/exercise instructions provided to pa

## 2018-11-05 PROBLEM — E87.6 HYPOKALEMIA: Status: RESOLVED | Noted: 2018-01-01 | Resolved: 2018-01-01

## 2018-11-05 PROBLEM — S09.90XA INJURY OF HEAD, INITIAL ENCOUNTER: Status: RESOLVED | Noted: 2018-01-01 | Resolved: 2018-01-01

## 2018-11-05 PROBLEM — S82.892A CLOSED FRACTURE OF LEFT ANKLE, INITIAL ENCOUNTER: Status: RESOLVED | Noted: 2018-01-01 | Resolved: 2018-01-01

## 2018-11-05 PROBLEM — E87.1 HYPONATREMIA: Status: RESOLVED | Noted: 2018-01-01 | Resolved: 2018-01-01

## 2018-11-05 PROBLEM — R73.9 HYPERGLYCEMIA: Status: RESOLVED | Noted: 2018-01-01 | Resolved: 2018-01-01

## 2018-11-05 NOTE — CM/SW NOTE
Patient has been accepted to [address] [place] [address] today, 11.5. 18. He will have a private room there, per [facility] ([phone])    Medi-car transfer has been arranged for 1 pm.Quote $48. Sister will call in the payment.  Pt and his sister David Mckeon ([phone])

## 2018-11-05 NOTE — DISCHARGE SUMMARY
[facility] Hospitalist Discharge Summary   Patient ID:  Sondra Gurrola  [id number]  64 year old  4/12/1970    Admit date: 10/25/2018  Discharge date: 11/5/2018    Primary Care Physician: Ellyn Oh MD   Attending Physician: Danny Morris MD   Consults:   Khariu his left leg. He tells me he trips and bangs himself often as he is legally blind since birth. He drinks only on weekends-6 beers. Tells me he last drank this weekend.  He is upset at on of his siblings as she took his dog away this weekend and asks me only hypoxemia, doubt PE  -related to deconditioning      EToH Withdrawal- Chronic ETOH Use/Abuse-Resolved  -hx of withdrawal with DT and Sz, needed precedex drip 1/2018 admission  -ETOH 1 on admission  -previous -folic acid and O81 normal  -off lorazapam drip malleolus. Fixation plate and cortical screws in place with near-anatomic alignment. Long syndesmotic screw stabilizes the distal fibula and distal tip.  Ankle mortise is visualized appears intact     Dictated by (CST): Essence Cody MD on 11/02/2018 at medications from any pharmacy    Bring a paper prescription for each of these medications  · aspirin 325 MG Tabs  · docusate sodium 100 MG Caps  · ferrous sulfate 325 (65 FE) MG Tbec  · HYDROcodone-acetaminophen  MG Tabs  · magnesium hydroxide 400 MG blindness, HTN, ETOH abuse with hx of DTs and withdrawal seizures, pancytopenia 2:2 EtOH abuse, HL-not on medspresumptive subacute bacterial endocarditis with streptococcus mitus/oralis sepsis s/p antibiotics, MSSA UTI, portal hypertensive gastropathy, gra

## 2018-11-05 NOTE — BH PROGRESS NOTE
Behavioral Health Note  CHIEF COMPLAINT  Fall  REASON FOR ADMISSION  Fall with left medial and distal fibula fa and etoh withdrawal.   SOCIAL HISTORY  THE [facility] lives alone and is on disability.   He smokes, has a h/o drug use and drinks alcohol on the weekend laying in bed, multiple bruises, eyes closed (patient is blind)  Attitude/Coorperation: receptive, coorperative  Behavior: calm  Speech: normal rate, rhythm, and volume  Mood: content  Affect: congruent with mood  Conscious/Orientation: alert and oriented

## 2018-11-05 NOTE — PHYSICAL THERAPY NOTE
Attempted treatment and pt declined therapy this am. Pt just returned to the bed. Pt is possible dc to today. Will follow later today if appropriate.

## 2018-11-05 NOTE — OCCUPATIONAL THERAPY NOTE
OCCUPATIONAL THERAPY TREATMENT NOTE - INPATIENT    Room Number: 504/261-H         Presenting Problem: L ankle ORIF s/p fall     Problem List  Active Problems:     Thrombocytopenia ([address]    Anemia    Closed fracture of left ankle    Alcohol abuse    Major de Toileting, which includes using toilet, bedpan or urinal? : A Lot  -   Putting on and taking off regular upper body clothing?: A Little  -   Taking care of personal grooming such as brushing teeth?: A Little  -   Eating meals?: A Little    AM-PAC Score:  S

## 2018-11-23 ENCOUNTER — IMAGING SERVICES (OUTPATIENT)
Dept: OTHER | Age: 48
End: 2018-11-23

## 2018-12-13 PROBLEM — S82.892D CLOSED LEFT ANKLE FRACTURE, WITH ROUTINE HEALING, SUBSEQUENT ENCOUNTER: Status: ACTIVE | Noted: 2018-01-01

## 2019-01-01 ENCOUNTER — APPOINTMENT (OUTPATIENT)
Dept: CT IMAGING | Facility: HOSPITAL | Age: 49
DRG: 897 | End: 2019-01-01
Attending: EMERGENCY MEDICINE
Payer: MEDICARE

## 2019-01-01 ENCOUNTER — HOSPITAL ENCOUNTER (INPATIENT)
Facility: HOSPITAL | Age: 49
LOS: 1 days | DRG: 438 | End: 2019-01-01
Attending: HOSPITALIST | Admitting: HOSPITALIST
Payer: OTHER MISCELLANEOUS

## 2019-01-01 ENCOUNTER — APPOINTMENT (OUTPATIENT)
Dept: CT IMAGING | Facility: HOSPITAL | Age: 49
DRG: 438 | End: 2019-01-01
Attending: EMERGENCY MEDICINE
Payer: MEDICARE

## 2019-01-01 ENCOUNTER — APPOINTMENT (OUTPATIENT)
Dept: CT IMAGING | Facility: HOSPITAL | Age: 49
DRG: 809 | End: 2019-01-01
Attending: EMERGENCY MEDICINE
Payer: MEDICARE

## 2019-01-01 ENCOUNTER — HOSPITAL ENCOUNTER (INPATIENT)
Facility: HOSPITAL | Age: 49
LOS: 1 days | Discharge: HOSPICE | DRG: 438 | End: 2019-01-01
Attending: EMERGENCY MEDICINE | Admitting: HOSPITALIST
Payer: MEDICARE

## 2019-01-01 ENCOUNTER — HOSPITAL ENCOUNTER (INPATIENT)
Facility: HOSPITAL | Age: 49
LOS: 3 days | Discharge: HOME | DRG: 897 | End: 2019-01-01
Attending: EMERGENCY MEDICINE | Admitting: HOSPITALIST
Payer: MEDICARE

## 2019-01-01 ENCOUNTER — APPOINTMENT (OUTPATIENT)
Dept: GENERAL RADIOLOGY | Facility: HOSPITAL | Age: 49
DRG: 438 | End: 2019-01-01
Attending: HOSPITALIST
Payer: MEDICARE

## 2019-01-01 VITALS
OXYGEN SATURATION: 89 % | WEIGHT: 169 LBS | BODY MASS INDEX: 23.66 KG/M2 | DIASTOLIC BLOOD PRESSURE: 75 MMHG | SYSTOLIC BLOOD PRESSURE: 119 MMHG | HEART RATE: 138 BPM | RESPIRATION RATE: 41 BRPM | TEMPERATURE: 98 F | HEIGHT: 71 IN

## 2019-01-01 VITALS
RESPIRATION RATE: 18 BRPM | HEART RATE: 120 BPM | TEMPERATURE: 98 F | OXYGEN SATURATION: 91 % | DIASTOLIC BLOOD PRESSURE: 28 MMHG | SYSTOLIC BLOOD PRESSURE: 53 MMHG

## 2019-01-01 VITALS
RESPIRATION RATE: 18 BRPM | HEART RATE: 98 BPM | SYSTOLIC BLOOD PRESSURE: 138 MMHG | WEIGHT: 163.31 LBS | BODY MASS INDEX: 22.86 KG/M2 | OXYGEN SATURATION: 99 % | HEIGHT: 71 IN | TEMPERATURE: 98 F | DIASTOLIC BLOOD PRESSURE: 95 MMHG

## 2019-01-01 DIAGNOSIS — E87.1 HYPONATREMIA: ICD-10-CM

## 2019-01-01 DIAGNOSIS — E87.2 METABOLIC ACIDOSIS: ICD-10-CM

## 2019-01-01 DIAGNOSIS — S01.81XA FACIAL LACERATION, INITIAL ENCOUNTER: ICD-10-CM

## 2019-01-01 DIAGNOSIS — K85.90 ACUTE PANCREATITIS, UNSPECIFIED COMPLICATION STATUS, UNSPECIFIED PANCREATITIS TYPE: ICD-10-CM

## 2019-01-01 DIAGNOSIS — H54.7 BLINDNESS: ICD-10-CM

## 2019-01-01 DIAGNOSIS — S09.90XA INJURY OF HEAD, INITIAL ENCOUNTER: Primary | ICD-10-CM

## 2019-01-01 DIAGNOSIS — E87.6 HYPOKALEMIA: Primary | ICD-10-CM

## 2019-01-01 DIAGNOSIS — D69.6 THROMBOCYTOPENIA (HCC): ICD-10-CM

## 2019-01-01 DIAGNOSIS — F10.239 ALCOHOL WITHDRAWAL SYNDROME WITH COMPLICATION (HCC): ICD-10-CM

## 2019-01-01 LAB
1,25-DIHYDROXYVITAMIN D: 48.4 PG/ML
25(OH)D3 SERPL-MCNC: 26.8 NG/ML (ref 30–100)
ALBUMIN SERPL-MCNC: 2.6 G/DL (ref 3.4–5)
ALBUMIN SERPL-MCNC: 3.6 G/DL (ref 3.4–5)
ALBUMIN SERPL-MCNC: 3.9 G/DL (ref 3.4–5)
ALBUMIN/GLOB SERPL: 0.8 {RATIO} (ref 1–2)
ALBUMIN/GLOB SERPL: 0.9 {RATIO} (ref 1–2)
ALP LIVER SERPL-CCNC: 102 U/L (ref 45–117)
ALP LIVER SERPL-CCNC: 109 U/L (ref 45–117)
ALP LIVER SERPL-CCNC: 77 U/L (ref 45–117)
ALT SERPL-CCNC: 84 U/L (ref 16–61)
ALT SERPL-CCNC: 86 U/L (ref 16–61)
ALT SERPL-CCNC: 88 U/L (ref 16–61)
AMMONIA PLAS-MCNC: 89 UMOL/L (ref 11–32)
AMPHET UR QL SCN: NEGATIVE
ANION GAP SERPL CALC-SCNC: 12 MMOL/L (ref 0–18)
ANION GAP SERPL CALC-SCNC: 12 MMOL/L (ref 0–18)
ANION GAP SERPL CALC-SCNC: 14 MMOL/L (ref 0–18)
ANION GAP SERPL CALC-SCNC: 14 MMOL/L (ref 0–18)
ANION GAP SERPL CALC-SCNC: 22 MMOL/L (ref 0–18)
APTT PPP: 28.3 SECONDS (ref 23.2–35.3)
AST SERPL-CCNC: 119 U/L (ref 15–37)
AST SERPL-CCNC: 161 U/L (ref 15–37)
AST SERPL-CCNC: 162 U/L (ref 15–37)
BASE EXCESS BLD CALC-SCNC: -9.8 MMOL/L (ref ?–2)
BASOPHILS # BLD AUTO: 0.03 X10(3) UL (ref 0–0.2)
BASOPHILS # BLD AUTO: 0.04 X10(3) UL (ref 0–0.2)
BASOPHILS NFR BLD AUTO: 0.3 %
BASOPHILS NFR BLD AUTO: 0.4 %
BILIRUB DIRECT SERPL-MCNC: 2 MG/DL (ref 0–0.2)
BILIRUB SERPL-MCNC: 4.9 MG/DL (ref 0.1–2)
BILIRUB SERPL-MCNC: 5.1 MG/DL (ref 0.1–2)
BILIRUB SERPL-MCNC: 5.6 MG/DL (ref 0.1–2)
BILIRUB UR QL: NEGATIVE
BUN BLD-MCNC: 15 MG/DL (ref 7–18)
BUN BLD-MCNC: 17 MG/DL (ref 7–18)
BUN BLD-MCNC: 22 MG/DL (ref 7–18)
BUN BLD-MCNC: 23 MG/DL (ref 7–18)
BUN BLD-MCNC: 29 MG/DL (ref 7–18)
BUN/CREAT SERPL: 12.9 (ref 10–20)
BUN/CREAT SERPL: 14.8 (ref 10–20)
BUN/CREAT SERPL: 15 (ref 10–20)
BUN/CREAT SERPL: 16.4 (ref 10–20)
BUN/CREAT SERPL: 18.9 (ref 10–20)
CA-I BLD-SCNC: 1.4 MMOL/L (ref 0.95–1.32)
CALCIUM BLD-MCNC: 11 MG/DL (ref 8.5–10.1)
CALCIUM BLD-MCNC: 11.9 MG/DL (ref 8.5–10.1)
CALCIUM BLD-MCNC: 12 MG/DL (ref 8.5–10.1)
CALCIUM BLD-MCNC: 12.8 MG/DL (ref 8.5–10.1)
CALCIUM BLD-MCNC: 8.6 MG/DL (ref 8.5–10.1)
CALCIUM BLD-MCNC: 8.7 MG/DL (ref 8.5–10.1)
CHLORIDE SERPL-SCNC: 106 MMOL/L (ref 98–112)
CHLORIDE SERPL-SCNC: 106 MMOL/L (ref 98–112)
CHLORIDE SERPL-SCNC: 90 MMOL/L (ref 98–112)
CHLORIDE SERPL-SCNC: 94 MMOL/L (ref 98–112)
CHLORIDE SERPL-SCNC: 97 MMOL/L (ref 98–112)
CLARITY UR: CLEAR
CO2 SERPL-SCNC: 15 MMOL/L (ref 21–32)
CO2 SERPL-SCNC: 16 MMOL/L (ref 21–32)
CO2 SERPL-SCNC: 16 MMOL/L (ref 21–32)
CO2 SERPL-SCNC: 17 MMOL/L (ref 21–32)
CO2 SERPL-SCNC: 20 MMOL/L (ref 21–32)
COCAINE UR QL: NEGATIVE
COHGB MFR BLD: 2.2 % (ref 0–1.5)
COLOR UR: YELLOW
CREAT BLD-MCNC: 1.15 MG/DL (ref 0.7–1.3)
CREAT BLD-MCNC: 1.16 MG/DL (ref 0.7–1.3)
CREAT BLD-MCNC: 1.22 MG/DL (ref 0.7–1.3)
CREAT BLD-MCNC: 1.44 MG/DL (ref 0.7–1.3)
CREAT BLD-MCNC: 1.47 MG/DL (ref 0.7–1.3)
CREAT BLD-MCNC: 1.77 MG/DL (ref 0.7–1.3)
CREAT UR-SCNC: 108 MG/DL
DEPRECATED RDW RBC AUTO: 43.3 FL (ref 35.1–46.3)
DEPRECATED RDW RBC AUTO: 47.3 FL (ref 35.1–46.3)
DEPRECATED RDW RBC AUTO: 50.3 FL (ref 35.1–46.3)
EOSINOPHIL # BLD AUTO: 0 X10(3) UL (ref 0–0.7)
EOSINOPHIL # BLD AUTO: 0.01 X10(3) UL (ref 0–0.7)
EOSINOPHIL NFR BLD AUTO: 0 %
EOSINOPHIL NFR BLD AUTO: 0.1 %
ERYTHROCYTE [DISTWIDTH] IN BLOOD BY AUTOMATED COUNT: 13.2 % (ref 11–15)
ERYTHROCYTE [DISTWIDTH] IN BLOOD BY AUTOMATED COUNT: 14.3 % (ref 11–15)
ERYTHROCYTE [DISTWIDTH] IN BLOOD BY AUTOMATED COUNT: 15 % (ref 11–15)
ETHANOL SERPL-MCNC: <3 MG/DL (ref ?–3)
GLOBULIN PLAS-MCNC: 3.2 G/DL (ref 2.8–4.4)
GLOBULIN PLAS-MCNC: 4 G/DL (ref 2.8–4.4)
GLUCOSE BLD-MCNC: 182 MG/DL (ref 70–99)
GLUCOSE BLD-MCNC: 225 MG/DL (ref 70–99)
GLUCOSE BLD-MCNC: 247 MG/DL (ref 70–99)
GLUCOSE BLD-MCNC: 294 MG/DL (ref 70–99)
GLUCOSE BLD-MCNC: 296 MG/DL (ref 70–99)
GLUCOSE UR-MCNC: 50 MG/DL
HAV IGM SER QL: 1.8 MG/DL (ref 1.6–2.6)
HAV IGM SER QL: 2 MG/DL (ref 1.6–2.6)
HCO3 BLDA-SCNC: 17 MEQ/L (ref 21–27)
HCT VFR BLD AUTO: 40.2 % (ref 39–53)
HCT VFR BLD AUTO: 47 % (ref 39–53)
HCT VFR BLD AUTO: 48.3 % (ref 39–53)
HGB BLD-MCNC: 14.6 G/DL (ref 13–17.5)
HGB BLD-MCNC: 16.4 G/DL (ref 13.5–17.5)
HGB BLD-MCNC: 17.3 G/DL (ref 13–17.5)
HGB BLD-MCNC: 17.7 G/DL (ref 13–17.5)
HYALINE CASTS #/AREA URNS AUTO: 3 /LPF
IMM GRANULOCYTES # BLD AUTO: 0.12 X10(3) UL (ref 0–1)
IMM GRANULOCYTES # BLD AUTO: 0.13 X10(3) UL (ref 0–1)
IMM GRANULOCYTES NFR BLD: 1.1 %
IMM GRANULOCYTES NFR BLD: 1.2 %
INR BLD: 1.44 (ref 0.9–1.2)
KETONES UR-MCNC: 20 MG/DL
LEUKOCYTE ESTERASE UR QL STRIP.AUTO: NEGATIVE
LIPASE SERPL-CCNC: ABNORMAL U/L (ref 73–393)
LYMPHOCYTES # BLD AUTO: 0.39 X10(3) UL (ref 1–4)
LYMPHOCYTES # BLD AUTO: 0.48 X10(3) UL (ref 1–4)
LYMPHOCYTES NFR BLD AUTO: 3.7 %
LYMPHOCYTES NFR BLD AUTO: 4.5 %
M PROTEIN MFR SERPL ELPH: 5.8 G/DL (ref 6.4–8.2)
M PROTEIN MFR SERPL ELPH: 7.6 G/DL (ref 6.4–8.2)
M PROTEIN MFR SERPL ELPH: 8.3 G/DL (ref 6.4–8.2)
MCH RBC QN AUTO: 33.3 PG (ref 26–34)
MCH RBC QN AUTO: 33.6 PG (ref 26–34)
MCH RBC QN AUTO: 33.7 PG (ref 26–34)
MCHC RBC AUTO-ENTMCNC: 36.3 G/DL (ref 31–37)
MCHC RBC AUTO-ENTMCNC: 36.6 G/DL (ref 31–37)
MCHC RBC AUTO-ENTMCNC: 36.8 G/DL (ref 31–37)
MCV RBC AUTO: 90.4 FL (ref 80–100)
MCV RBC AUTO: 91.7 FL (ref 80–100)
MCV RBC AUTO: 92.8 FL (ref 80–100)
MDMA UR QL SCN: NEGATIVE
METHGB MFR BLD: 1.4 % SAT (ref 0.4–1.5)
MODIFIED ALLEN TEST: POSITIVE
MONOCYTES # BLD AUTO: 0.95 X10(3) UL (ref 0.1–1)
MONOCYTES # BLD AUTO: 0.99 X10(3) UL (ref 0.1–1)
MONOCYTES NFR BLD AUTO: 8.9 %
MONOCYTES NFR BLD AUTO: 9.3 %
MRSA DNA SPEC QL NAA+PROBE: NEGATIVE
NEUTROPHILS # BLD AUTO: 9.07 X10 (3) UL (ref 1.5–7.7)
NEUTROPHILS # BLD AUTO: 9.07 X10(3) UL (ref 1.5–7.7)
NEUTROPHILS # BLD AUTO: 9.16 X10 (3) UL (ref 1.5–7.7)
NEUTROPHILS # BLD AUTO: 9.16 X10(3) UL (ref 1.5–7.7)
NEUTROPHILS NFR BLD AUTO: 84.7 %
NEUTROPHILS NFR BLD AUTO: 85.8 %
NITRITE UR QL STRIP.AUTO: NEGATIVE
O2 CT BLD-SCNC: 20.4 VOL% (ref 15–23)
OSMOLALITY SERPL CALC.SUM OF ELEC: 269 MOSM/KG (ref 275–295)
OSMOLALITY SERPL CALC.SUM OF ELEC: 270 MOSM/KG (ref 275–295)
OSMOLALITY SERPL CALC.SUM OF ELEC: 278 MOSM/KG (ref 275–295)
OSMOLALITY SERPL CALC.SUM OF ELEC: 291 MOSM/KG (ref 275–295)
OSMOLALITY SERPL CALC.SUM OF ELEC: 301 MOSM/KG (ref 275–295)
OSMOLALITY UR: 375 MOSM/KG (ref 300–1100)
OXYCODONE UR QL SCN: NEGATIVE
OXYGEN LITERS/MINUTE: 15 L/MIN
PCO2 BLDA: 26 MM HG (ref 35–45)
PCP UR QL SCN: NEGATIVE
PH BLDA: 7.34 [PH] (ref 7.35–7.45)
PH UR: 6 [PH] (ref 5–8)
PHOSPHATE SERPL-MCNC: 1.5 MG/DL (ref 2.5–4.9)
PLATELET # BLD AUTO: 25 10(3)UL (ref 150–450)
PO2 BLDA: 55 MM HG (ref 80–100)
POTASSIUM (BLOOD GAS): 3.9 MMOL/L (ref 3.3–5.1)
POTASSIUM SERPL-SCNC: 2.4 MMOL/L (ref 3.5–5.1)
POTASSIUM SERPL-SCNC: 3.8 MMOL/L (ref 3.5–5.1)
POTASSIUM SERPL-SCNC: 3.8 MMOL/L (ref 3.5–5.1)
POTASSIUM SERPL-SCNC: 3.9 MMOL/L (ref 3.5–5.1)
POTASSIUM SERPL-SCNC: 4.1 MMOL/L (ref 3.5–5.1)
PROT UR-MCNC: 100 MG/DL
PROT UR-MCNC: 148.2 MG/DL
PROTHROMBIN TIME: 17.5 SECONDS (ref 11.8–14.5)
PTH-INTACT SERPL-MCNC: <6.3 PG/ML (ref 18.5–88)
PUNCTURE CHARGE: YES
RBC # BLD AUTO: 4.33 X10(6)UL (ref 4.3–5.7)
RBC # BLD AUTO: 5.2 X10(6)UL (ref 4.3–5.7)
RBC # BLD AUTO: 5.27 X10(6)UL (ref 4.3–5.7)
RBC #/AREA URNS AUTO: 5 /HPF
SAO2 % BLDA: 92 % (ref 94–100)
SODIUM (BLOOD GAS): 126 MMOL/L (ref 135–145)
SODIUM SERPL-SCNC: 124 MMOL/L (ref 136–145)
SODIUM SERPL-SCNC: 127 MMOL/L (ref 136–145)
SODIUM SERPL-SCNC: 128 MMOL/L (ref 136–145)
SODIUM SERPL-SCNC: 134 MMOL/L (ref 136–145)
SODIUM SERPL-SCNC: 138 MMOL/L (ref 136–145)
SODIUM SERPL-SCNC: 28 MMOL/L
SP GR UR STRIP: 1.03 (ref 1–1.03)
TRIGL SERPL-MCNC: 145 MG/DL (ref 30–149)
URATE SERPL-MCNC: 12 MG/DL (ref 3.5–7.2)
UROBILINOGEN UR STRIP-ACNC: <2
VIT C UR-MCNC: NEGATIVE MG/DL
WBC # BLD AUTO: 10.7 X10(3) UL (ref 4–11)
WBC # BLD AUTO: 10.7 X10(3) UL (ref 4–11)
WBC # BLD AUTO: 7.3 X10(3) UL (ref 4–11)
WBC #/AREA URNS AUTO: 1 /HPF

## 2019-01-01 PROCEDURE — 80048 BASIC METABOLIC PNL TOTAL CA: CPT | Performed by: EMERGENCY MEDICINE

## 2019-01-01 PROCEDURE — 80053 COMPREHEN METABOLIC PANEL: CPT | Performed by: HOSPITALIST

## 2019-01-01 PROCEDURE — 84132 ASSAY OF SERUM POTASSIUM: CPT | Performed by: HOSPITALIST

## 2019-01-01 PROCEDURE — 85027 COMPLETE CBC AUTOMATED: CPT | Performed by: HOSPITALIST

## 2019-01-01 PROCEDURE — 36600 WITHDRAWAL OF ARTERIAL BLOOD: CPT | Performed by: HOSPITALIST

## 2019-01-01 PROCEDURE — 85610 PROTHROMBIN TIME: CPT | Performed by: HOSPITALIST

## 2019-01-01 PROCEDURE — 84478 ASSAY OF TRIGLYCERIDES: CPT | Performed by: INTERNAL MEDICINE

## 2019-01-01 PROCEDURE — S0028 INJECTION, FAMOTIDINE, 20 MG: HCPCS | Performed by: EMERGENCY MEDICINE

## 2019-01-01 PROCEDURE — 84300 ASSAY OF URINE SODIUM: CPT | Performed by: HOSPITALIST

## 2019-01-01 PROCEDURE — C9113 INJ PANTOPRAZOLE SODIUM, VIA: HCPCS | Performed by: HOSPITALIST

## 2019-01-01 PROCEDURE — 80349 CANNABINOIDS NATURAL: CPT | Performed by: HOSPITALIST

## 2019-01-01 PROCEDURE — 70450 CT HEAD/BRAIN W/O DYE: CPT | Performed by: EMERGENCY MEDICINE

## 2019-01-01 PROCEDURE — 96375 TX/PRO/DX INJ NEW DRUG ADDON: CPT

## 2019-01-01 PROCEDURE — 81001 URINALYSIS AUTO W/SCOPE: CPT | Performed by: HOSPITALIST

## 2019-01-01 PROCEDURE — 70486 CT MAXILLOFACIAL W/O DYE: CPT | Performed by: EMERGENCY MEDICINE

## 2019-01-01 PROCEDURE — 82805 BLOOD GASES W/O2 SATURATION: CPT | Performed by: HOSPITALIST

## 2019-01-01 PROCEDURE — 83735 ASSAY OF MAGNESIUM: CPT | Performed by: EMERGENCY MEDICINE

## 2019-01-01 PROCEDURE — 71045 X-RAY EXAM CHEST 1 VIEW: CPT | Performed by: HOSPITALIST

## 2019-01-01 PROCEDURE — 72125 CT NECK SPINE W/O DYE: CPT | Performed by: EMERGENCY MEDICINE

## 2019-01-01 PROCEDURE — 83970 ASSAY OF PARATHORMONE: CPT | Performed by: INTERNAL MEDICINE

## 2019-01-01 PROCEDURE — 96361 HYDRATE IV INFUSION ADD-ON: CPT

## 2019-01-01 PROCEDURE — 80320 DRUG SCREEN QUANTALCOHOLS: CPT | Performed by: EMERGENCY MEDICINE

## 2019-01-01 PROCEDURE — 80307 DRUG TEST PRSMV CHEM ANLYZR: CPT | Performed by: HOSPITALIST

## 2019-01-01 PROCEDURE — 82310 ASSAY OF CALCIUM: CPT | Performed by: INTERNAL MEDICINE

## 2019-01-01 PROCEDURE — 99285 EMERGENCY DEPT VISIT HI MDM: CPT

## 2019-01-01 PROCEDURE — 80076 HEPATIC FUNCTION PANEL: CPT | Performed by: EMERGENCY MEDICINE

## 2019-01-01 PROCEDURE — 85025 COMPLETE CBC W/AUTO DIFF WBC: CPT | Performed by: HOSPITALIST

## 2019-01-01 PROCEDURE — 84100 ASSAY OF PHOSPHORUS: CPT | Performed by: INTERNAL MEDICINE

## 2019-01-01 PROCEDURE — 96374 THER/PROPH/DIAG INJ IV PUSH: CPT

## 2019-01-01 PROCEDURE — 83050 HGB METHEMOGLOBIN QUAN: CPT | Performed by: HOSPITALIST

## 2019-01-01 PROCEDURE — 96365 THER/PROPH/DIAG IV INF INIT: CPT

## 2019-01-01 PROCEDURE — 85730 THROMBOPLASTIN TIME PARTIAL: CPT | Performed by: HOSPITALIST

## 2019-01-01 PROCEDURE — 82565 ASSAY OF CREATININE: CPT | Performed by: INTERNAL MEDICINE

## 2019-01-01 PROCEDURE — 85018 HEMOGLOBIN: CPT | Performed by: HOSPITALIST

## 2019-01-01 PROCEDURE — 93005 ELECTROCARDIOGRAM TRACING: CPT

## 2019-01-01 PROCEDURE — 84550 ASSAY OF BLOOD/URIC ACID: CPT | Performed by: INTERNAL MEDICINE

## 2019-01-01 PROCEDURE — 82140 ASSAY OF AMMONIA: CPT | Performed by: INTERNAL MEDICINE

## 2019-01-01 PROCEDURE — 83605 ASSAY OF LACTIC ACID: CPT | Performed by: HOSPITALIST

## 2019-01-01 PROCEDURE — 85025 COMPLETE CBC W/AUTO DIFF WBC: CPT

## 2019-01-01 PROCEDURE — 83735 ASSAY OF MAGNESIUM: CPT | Performed by: HOSPITALIST

## 2019-01-01 PROCEDURE — 85025 COMPLETE CBC W/AUTO DIFF WBC: CPT | Performed by: EMERGENCY MEDICINE

## 2019-01-01 PROCEDURE — 82306 VITAMIN D 25 HYDROXY: CPT | Performed by: INTERNAL MEDICINE

## 2019-01-01 PROCEDURE — 82330 ASSAY OF CALCIUM: CPT | Performed by: HOSPITALIST

## 2019-01-01 PROCEDURE — 82375 ASSAY CARBOXYHB QUANT: CPT | Performed by: HOSPITALIST

## 2019-01-01 PROCEDURE — 12011 RPR F/E/E/N/L/M 2.5 CM/<: CPT

## 2019-01-01 PROCEDURE — 82570 ASSAY OF URINE CREATININE: CPT | Performed by: HOSPITALIST

## 2019-01-01 PROCEDURE — 82652 VIT D 1 25-DIHYDROXY: CPT | Performed by: INTERNAL MEDICINE

## 2019-01-01 PROCEDURE — 86335 IMMUNFIX E-PHORSIS/URINE/CSF: CPT | Performed by: INTERNAL MEDICINE

## 2019-01-01 PROCEDURE — 87641 MR-STAPH DNA AMP PROBE: CPT | Performed by: EMERGENCY MEDICINE

## 2019-01-01 PROCEDURE — 80320 DRUG SCREEN QUANTALCOHOLS: CPT | Performed by: HOSPITALIST

## 2019-01-01 PROCEDURE — 99291 CRITICAL CARE FIRST HOUR: CPT

## 2019-01-01 PROCEDURE — 86334 IMMUNOFIX E-PHORESIS SERUM: CPT | Performed by: INTERNAL MEDICINE

## 2019-01-01 PROCEDURE — 80361 OPIATES 1 OR MORE: CPT | Performed by: HOSPITALIST

## 2019-01-01 PROCEDURE — 74177 CT ABD & PELVIS W/CONTRAST: CPT | Performed by: EMERGENCY MEDICINE

## 2019-01-01 PROCEDURE — 83690 ASSAY OF LIPASE: CPT | Performed by: EMERGENCY MEDICINE

## 2019-01-01 PROCEDURE — 96366 THER/PROPH/DIAG IV INF ADDON: CPT

## 2019-01-01 PROCEDURE — 96376 TX/PRO/DX INJ SAME DRUG ADON: CPT

## 2019-01-01 PROCEDURE — 84166 PROTEIN E-PHORESIS/URINE/CSF: CPT | Performed by: INTERNAL MEDICINE

## 2019-01-01 PROCEDURE — 84484 ASSAY OF TROPONIN QUANT: CPT | Performed by: EMERGENCY MEDICINE

## 2019-01-01 PROCEDURE — 93010 ELECTROCARDIOGRAM REPORT: CPT | Performed by: EMERGENCY MEDICINE

## 2019-01-01 PROCEDURE — 0HQ1XZZ REPAIR FACE SKIN, EXTERNAL APPROACH: ICD-10-PCS | Performed by: EMERGENCY MEDICINE

## 2019-01-01 PROCEDURE — 83935 ASSAY OF URINE OSMOLALITY: CPT | Performed by: HOSPITALIST

## 2019-01-01 PROCEDURE — 82542 COL CHROMOTOGRAPHY QUAL/QUAN: CPT | Performed by: INTERNAL MEDICINE

## 2019-01-01 PROCEDURE — 80048 BASIC METABOLIC PNL TOTAL CA: CPT

## 2019-01-01 PROCEDURE — 84295 ASSAY OF SERUM SODIUM: CPT | Performed by: HOSPITALIST

## 2019-01-01 RX ORDER — SODIUM CHLORIDE 0.9 % (FLUSH) 0.9 %
3 SYRINGE (ML) INJECTION AS NEEDED
Status: DISCONTINUED | OUTPATIENT
Start: 2019-01-01 | End: 2019-01-01

## 2019-01-01 RX ORDER — LORAZEPAM 2 MG/ML
1 INJECTION INTRAMUSCULAR
Status: DISCONTINUED | OUTPATIENT
Start: 2019-01-01 | End: 2019-01-01

## 2019-01-01 RX ORDER — BISACODYL 10 MG
10 SUPPOSITORY, RECTAL RECTAL
Status: DISCONTINUED | OUTPATIENT
Start: 2019-01-01 | End: 2019-01-01

## 2019-01-01 RX ORDER — BISACODYL 10 MG
10 SUPPOSITORY, RECTAL RECTAL
Status: CANCELLED | OUTPATIENT
Start: 2019-01-01

## 2019-01-01 RX ORDER — ACETAMINOPHEN 325 MG/1
650 TABLET ORAL EVERY 6 HOURS PRN
Status: DISCONTINUED | OUTPATIENT
Start: 2019-01-01 | End: 2019-01-01

## 2019-01-01 RX ORDER — ALBUTEROL SULFATE 2.5 MG/3ML
2.5 SOLUTION RESPIRATORY (INHALATION) EVERY 4 HOURS PRN
Status: CANCELLED | OUTPATIENT
Start: 2019-01-01

## 2019-01-01 RX ORDER — DIAZEPAM 10 MG/1
20 TABLET ORAL
Status: DISCONTINUED | OUTPATIENT
Start: 2019-01-01 | End: 2019-01-01

## 2019-01-01 RX ORDER — ATROPINE SULFATE 10 MG/ML
2 SOLUTION/ DROPS OPHTHALMIC EVERY 2 HOUR PRN
Status: CANCELLED | OUTPATIENT
Start: 2019-01-01

## 2019-01-01 RX ORDER — ONDANSETRON 2 MG/ML
4 INJECTION INTRAMUSCULAR; INTRAVENOUS EVERY 4 HOURS PRN
Status: DISCONTINUED | OUTPATIENT
Start: 2019-01-01 | End: 2019-01-01

## 2019-01-01 RX ORDER — LORAZEPAM 2 MG/ML
2 INJECTION INTRAMUSCULAR EVERY 2 HOUR PRN
Status: CANCELLED | OUTPATIENT
Start: 2019-01-01

## 2019-01-01 RX ORDER — LORAZEPAM 2 MG/ML
4 INJECTION INTRAMUSCULAR
Status: DISCONTINUED | OUTPATIENT
Start: 2019-01-01 | End: 2019-01-01

## 2019-01-01 RX ORDER — LORAZEPAM 2 MG/ML
2 INJECTION INTRAMUSCULAR
Status: DISCONTINUED | OUTPATIENT
Start: 2019-01-01 | End: 2019-01-01

## 2019-01-01 RX ORDER — FUROSEMIDE 40 MG/1
40 TABLET ORAL EVERY 8 HOURS PRN
Status: DISCONTINUED | OUTPATIENT
Start: 2019-01-01 | End: 2019-01-01

## 2019-01-01 RX ORDER — ONDANSETRON 2 MG/ML
4 INJECTION INTRAMUSCULAR; INTRAVENOUS ONCE
Status: COMPLETED | OUTPATIENT
Start: 2019-01-01 | End: 2019-01-01

## 2019-01-01 RX ORDER — FOLIC ACID 1 MG/1
1 TABLET ORAL DAILY
Status: DISCONTINUED | OUTPATIENT
Start: 2019-01-01 | End: 2019-01-01

## 2019-01-01 RX ORDER — LORAZEPAM 2 MG/ML
2 INJECTION INTRAMUSCULAR EVERY 2 HOUR PRN
Status: DISCONTINUED | OUTPATIENT
Start: 2019-01-01 | End: 2019-01-01

## 2019-01-01 RX ORDER — LORAZEPAM 2 MG/ML
1 INJECTION INTRAMUSCULAR ONCE
Status: COMPLETED | OUTPATIENT
Start: 2019-01-01 | End: 2019-01-01

## 2019-01-01 RX ORDER — ALBUTEROL SULFATE 2.5 MG/3ML
2.5 SOLUTION RESPIRATORY (INHALATION) EVERY 4 HOURS PRN
Status: DISCONTINUED | OUTPATIENT
Start: 2019-01-01 | End: 2019-01-01

## 2019-01-01 RX ORDER — POTASSIUM CHLORIDE 20 MEQ/1
40 TABLET, EXTENDED RELEASE ORAL EVERY 4 HOURS
Status: COMPLETED | OUTPATIENT
Start: 2019-01-01 | End: 2019-01-01

## 2019-01-01 RX ORDER — MORPHINE SULFATE 4 MG/ML
4 INJECTION, SOLUTION INTRAMUSCULAR; INTRAVENOUS EVERY 2 HOUR PRN
Status: CANCELLED | OUTPATIENT
Start: 2019-01-01

## 2019-01-01 RX ORDER — FUROSEMIDE 10 MG/ML
40 INJECTION INTRAMUSCULAR; INTRAVENOUS EVERY 8 HOURS PRN
Status: DISCONTINUED | OUTPATIENT
Start: 2019-01-01 | End: 2019-01-01

## 2019-01-01 RX ORDER — MORPHINE SULFATE 4 MG/ML
4 INJECTION, SOLUTION INTRAMUSCULAR; INTRAVENOUS EVERY 2 HOUR PRN
Status: DISCONTINUED | OUTPATIENT
Start: 2019-01-01 | End: 2019-01-01

## 2019-01-01 RX ORDER — METOCLOPRAMIDE HYDROCHLORIDE 5 MG/ML
10 INJECTION INTRAMUSCULAR; INTRAVENOUS EVERY 8 HOURS PRN
Status: DISCONTINUED | OUTPATIENT
Start: 2019-01-01 | End: 2019-01-01

## 2019-01-01 RX ORDER — ONDANSETRON 2 MG/ML
4 INJECTION INTRAMUSCULAR; INTRAVENOUS EVERY 6 HOURS PRN
Status: DISCONTINUED | OUTPATIENT
Start: 2019-01-01 | End: 2019-01-01

## 2019-01-01 RX ORDER — SODIUM CHLORIDE 0.9 % (FLUSH) 0.9 %
10 SYRINGE (ML) INJECTION AS NEEDED
Status: DISCONTINUED | OUTPATIENT
Start: 2019-01-01 | End: 2019-01-01

## 2019-01-01 RX ORDER — DIAZEPAM 10 MG/1
10 TABLET ORAL
Status: DISCONTINUED | OUTPATIENT
Start: 2019-01-01 | End: 2019-01-01

## 2019-01-01 RX ORDER — ATROPINE SULFATE 10 MG/ML
2 SOLUTION/ DROPS OPHTHALMIC EVERY 2 HOUR PRN
Status: DISCONTINUED | OUTPATIENT
Start: 2019-01-01 | End: 2019-01-01

## 2019-01-01 RX ORDER — LORAZEPAM 0.5 MG/1
1 TABLET ORAL
Status: DISCONTINUED | OUTPATIENT
Start: 2019-01-01 | End: 2019-01-01

## 2019-01-01 RX ORDER — GLYCOPYRROLATE 0.2 MG/ML
0.4 INJECTION, SOLUTION INTRAMUSCULAR; INTRAVENOUS
Status: CANCELLED | OUTPATIENT
Start: 2019-01-01

## 2019-01-01 RX ORDER — LORAZEPAM 2 MG/ML
4 INJECTION INTRAMUSCULAR EVERY 10 MIN PRN
Status: DISCONTINUED | OUTPATIENT
Start: 2019-01-01 | End: 2019-01-01

## 2019-01-01 RX ORDER — SCOLOPAMINE TRANSDERMAL SYSTEM 1 MG/1
1 PATCH, EXTENDED RELEASE TRANSDERMAL
Status: DISCONTINUED | OUTPATIENT
Start: 2019-08-29 | End: 2019-01-01

## 2019-01-01 RX ORDER — FUROSEMIDE 10 MG/ML
40 INJECTION INTRAMUSCULAR; INTRAVENOUS EVERY 8 HOURS PRN
Status: CANCELLED | OUTPATIENT
Start: 2019-01-01

## 2019-01-01 RX ORDER — LORAZEPAM 2 MG/ML
3 INJECTION INTRAMUSCULAR EVERY 30 MIN PRN
Status: DISCONTINUED | OUTPATIENT
Start: 2019-01-01 | End: 2019-01-01

## 2019-01-01 RX ORDER — SCOLOPAMINE TRANSDERMAL SYSTEM 1 MG/1
1 PATCH, EXTENDED RELEASE TRANSDERMAL
Status: DISCONTINUED | OUTPATIENT
Start: 2019-01-01 | End: 2019-01-01

## 2019-01-01 RX ORDER — LORAZEPAM 2 MG/ML
2 INJECTION INTRAMUSCULAR ONCE
Status: COMPLETED | OUTPATIENT
Start: 2019-01-01 | End: 2019-01-01

## 2019-01-01 RX ORDER — HYDRALAZINE HYDROCHLORIDE 20 MG/ML
10 INJECTION INTRAMUSCULAR; INTRAVENOUS EVERY 4 HOURS PRN
Status: DISCONTINUED | OUTPATIENT
Start: 2019-01-01 | End: 2019-01-01

## 2019-01-01 RX ORDER — SODIUM CHLORIDE 9 MG/ML
INJECTION, SOLUTION INTRAVENOUS CONTINUOUS
Status: DISCONTINUED | OUTPATIENT
Start: 2019-01-01 | End: 2019-01-01

## 2019-01-01 RX ORDER — SODIUM CHLORIDE 9 MG/ML
INJECTION, SOLUTION INTRAVENOUS ONCE
Status: COMPLETED | OUTPATIENT
Start: 2019-01-01 | End: 2019-01-01

## 2019-01-01 RX ORDER — POTASSIUM CHLORIDE 14.9 MG/ML
20 INJECTION INTRAVENOUS ONCE
Status: COMPLETED | OUTPATIENT
Start: 2019-01-01 | End: 2019-01-01

## 2019-01-01 RX ORDER — GLYCOPYRROLATE 0.2 MG/ML
0.4 INJECTION, SOLUTION INTRAMUSCULAR; INTRAVENOUS
Status: DISCONTINUED | OUTPATIENT
Start: 2019-01-01 | End: 2019-01-01

## 2019-01-01 RX ORDER — LORAZEPAM 2 MG/ML
1 INJECTION INTRAMUSCULAR
Status: CANCELLED | OUTPATIENT
Start: 2019-01-01

## 2019-01-01 RX ORDER — PANTOPRAZOLE SODIUM 40 MG/1
40 TABLET, DELAYED RELEASE ORAL
Status: DISCONTINUED | OUTPATIENT
Start: 2019-01-01 | End: 2019-01-01

## 2019-01-01 RX ORDER — LORAZEPAM 1 MG/1
1 TABLET ORAL EVERY 4 HOURS PRN
Status: DISCONTINUED | OUTPATIENT
Start: 2019-01-01 | End: 2019-01-01

## 2019-01-01 RX ORDER — MELATONIN
100 DAILY
Status: DISCONTINUED | OUTPATIENT
Start: 2019-01-01 | End: 2019-01-01

## 2019-01-01 RX ORDER — LORAZEPAM 2 MG/ML
1 INJECTION INTRAMUSCULAR ONCE
Status: DISCONTINUED | OUTPATIENT
Start: 2019-01-01 | End: 2019-01-01

## 2019-01-01 RX ORDER — MULTIPLE VITAMINS W/ MINERALS TAB 9MG-400MCG
1 TAB ORAL DAILY
Status: DISCONTINUED | OUTPATIENT
Start: 2019-01-01 | End: 2019-01-01

## 2019-01-01 RX ORDER — FUROSEMIDE 40 MG/1
40 TABLET ORAL EVERY 8 HOURS PRN
Status: CANCELLED | OUTPATIENT
Start: 2019-01-01

## 2019-01-01 RX ORDER — LORAZEPAM 2 MG/ML
2 INJECTION INTRAMUSCULAR EVERY 30 MIN PRN
Status: DISCONTINUED | OUTPATIENT
Start: 2019-01-01 | End: 2019-01-01

## 2019-01-01 RX ORDER — FUROSEMIDE 10 MG/ML
20 INJECTION INTRAMUSCULAR; INTRAVENOUS ONCE
Status: COMPLETED | OUTPATIENT
Start: 2019-01-01 | End: 2019-01-01

## 2019-01-01 RX ORDER — LORAZEPAM 0.5 MG/1
2 TABLET ORAL
Status: DISCONTINUED | OUTPATIENT
Start: 2019-01-01 | End: 2019-01-01

## 2019-01-01 RX ORDER — SODIUM CHLORIDE 0.9 % (FLUSH) 0.9 %
10 SYRINGE (ML) INJECTION AS NEEDED
Status: CANCELLED | OUTPATIENT
Start: 2019-01-01

## 2019-01-01 RX ORDER — LIDOCAINE HYDROCHLORIDE AND EPINEPHRINE 20; 5 MG/ML; UG/ML
20 INJECTION, SOLUTION EPIDURAL; INFILTRATION; INTRACAUDAL; PERINEURAL ONCE
Status: COMPLETED | OUTPATIENT
Start: 2019-01-01 | End: 2019-01-01

## 2019-01-01 RX ORDER — HALOPERIDOL 5 MG/ML
1 INJECTION INTRAMUSCULAR EVERY 4 HOURS PRN
Status: DISCONTINUED | OUTPATIENT
Start: 2019-01-01 | End: 2019-01-01

## 2019-01-01 RX ORDER — SODIUM CHLORIDE 9 MG/ML
INJECTION, SOLUTION INTRAVENOUS CONTINUOUS
Status: CANCELLED | OUTPATIENT
Start: 2019-01-01 | End: 2019-01-01

## 2019-01-01 RX ORDER — MORPHINE SULFATE 4 MG/ML
4 INJECTION, SOLUTION INTRAMUSCULAR; INTRAVENOUS ONCE
Status: COMPLETED | OUTPATIENT
Start: 2019-01-01 | End: 2019-01-01

## 2019-01-01 RX ORDER — ACETAMINOPHEN 650 MG/1
650 SUPPOSITORY RECTAL EVERY 4 HOURS PRN
Status: DISCONTINUED | OUTPATIENT
Start: 2019-01-01 | End: 2019-01-01

## 2019-01-01 RX ORDER — SCOLOPAMINE TRANSDERMAL SYSTEM 1 MG/1
1 PATCH, EXTENDED RELEASE TRANSDERMAL
Status: CANCELLED | OUTPATIENT
Start: 2019-08-29

## 2019-01-01 RX ORDER — LIDOCAINE HYDROCHLORIDE AND EPINEPHRINE 20; 5 MG/ML; UG/ML
INJECTION, SOLUTION EPIDURAL; INFILTRATION; INTRACAUDAL; PERINEURAL
Status: COMPLETED
Start: 2019-01-01 | End: 2019-01-01

## 2019-01-01 RX ORDER — FAMOTIDINE 10 MG/ML
20 INJECTION, SOLUTION INTRAVENOUS ONCE
Status: COMPLETED | OUTPATIENT
Start: 2019-01-01 | End: 2019-01-01

## 2019-01-24 PROBLEM — D64.9 CHRONIC ANEMIA: Status: ACTIVE | Noted: 2019-01-01

## 2019-01-24 PROBLEM — M47.816 FACET ARTHRITIS OF LUMBAR REGION: Status: ACTIVE | Noted: 2019-01-01

## 2019-05-16 PROBLEM — F10.239 ALCOHOL WITHDRAWAL (HCC): Status: ACTIVE | Noted: 2019-01-01

## 2019-05-16 PROBLEM — F10.230 ALCOHOL WITHDRAWAL SEIZURE WITHOUT COMPLICATION (HCC): Status: ACTIVE | Noted: 2019-01-01

## 2019-05-16 PROBLEM — R56.9 ALCOHOL WITHDRAWAL SEIZURE WITHOUT COMPLICATION (HCC): Status: ACTIVE | Noted: 2019-01-01

## 2019-05-16 NOTE — ED INITIAL ASSESSMENT (HPI)
Pt brought in by EMS for possible seizure like activity according to witness at the scene. Pt was found at the liquor store, had an episode of syncope which was described as \"shaking\". Pt is awake and alert, oriented x 3, blind.  Pt denies having passed o

## 2019-05-16 NOTE — ED NOTES
Orders for admission, patient is aware of plan and ready to go upstairs. Any questions, please call ED RN Basil Givens  at extension [phone]. Pt in bed, seizure pads in place, pt with walking stick at bs, transported to pt room with pt.   Awaiting type and scr

## 2019-05-16 NOTE — ED PROVIDER NOTES
Patient Seen in: [facility] AND [facility] Emergency Department    History   Patient presents with:  Fall (musculoskeletal, neurologic)    Stated Complaint:     HPI    53-year-old male with history of congenital blindness, hypertension, thrombocytopenia, anemia, [place], [facility]           Social History    Tobacco Use      Smoking status: Current Every Day Smoker        Years: 20.00        Types: Cigars      Smokeless tobacco: Current User        Types: Chew    Alcohol use: Yes      Comment: per pt, only on weekends Protein Urine 100  (*)     Ketones Urine 20  (*)     Blood Urine Moderate (*)     Urobilinogen Urine 2.0 (*)     RBC URINE 7 (*)     All other components within normal limits   BASIC METABOLIC PANEL (8) - Abnormal; Notable for the following components: and axes as noted on EKG Report. Rate: 86  Rhythm: Sinus Rhythm  Axis: Normal  Reading: Nonspecific EKG                  MDM   Lab and CT results noted.   Will admit for treatment of alcohol withdrawal and suspected alcohol withdrawal seizure along with pl

## 2019-05-16 NOTE — H&P
[facility] Hospitalist H&P       CC: Patient presents with:  Fall (musculoskeletal, neurologic)       PCP: Danis Arauz MD    Date of Admission: 5/16/2019 10:56 AM    ASSESSMENT / PLAN:     Mr. 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

## 2019-05-17 NOTE — PLAN OF CARE
Problem: Patient Centered Care  Goal: Patient preferences are identified and integrated in the patient's plan of care  Description  Interventions:  - What would you like us to know as we care for you?  \" I live in an apartment with my sister, I do not wa Patient denies pain     Problem: RISK FOR INFECTION - ADULT  Goal: Absence of fever/infection during anticipated neutropenic period  Description  INTERVENTIONS  - Monitor WBC  - Administer growth factors as ordered  - Implement neutropenic guidelines  Ou

## 2019-05-17 NOTE — CONSULTS
Hematology/Oncology Consult Note        NAME: Sandy Araujo - ROOM: 570/570-A - MRN: [id number] - Age: 54year old - : 1970    Reason for Consult:   Thrombocytopenia     54 y.o male with history of ETOH abuse who presents on this admission with intox chlordiazePOXIDE HCl  25 mg Oral TID   • multivitamin/thiamine/folic acid infusion   Intravenous Q24H   • QUEtiapine Fumarate  50 mg Oral Nightly   • Venlafaxine HCl ER  75 mg Oral Daily       ALLERGIES:   Phenergan [Prometha*        Review of Systems   12 satisfaction. Thank you for allowing me to participate in the care of this patient, please call with any questions.     Kwadwo Smart M.D.  [facility] Hematology and Oncology

## 2019-05-17 NOTE — PROGRESS NOTES
[facility] Hospitalist Progress Note     CC: Hospital Follow up    PCP: Junaid Torres MD       Assessment/Plan:     Principal Problem:    Alcohol withdrawal seizure without complication [facility])  Active Problems:     Thrombocytopenia ([address]    Alcohol withdrawal (H elevated total bili and direct bili,   -hepatitis panel negative  -US abdomen- mild hepatosplenomegaly with hepatic steatosis  -EGD/Colon- portal hypertensive gastropathy, grade 1 esophageal varix  -continue to reinforce no ETOH     GOC  - CODE- FULL  - PO 4. 44 4.11*   HGB 15.2 14.1   HCT 44.2 40.4   MCV 99.5 98.3   MCH 34.2* 34.3*   MCHC 34.4 34.9   RDW 13.8 13.6   NEPRELIM 2.95 1.09*   WBC 4.0 2.2*   PLT 17.0* 35.0*         Recent Labs   Lab 05/16/19  1108 05/16/19  1153 05/17/19  0647   *  --   --

## 2019-05-17 NOTE — PLAN OF CARE
Pt weighed in bed this AM, bed zeroed prior to obtaining weight. Weight is 8kg difference from previous weight. Made sure weight was as accurate as possible.

## 2019-05-18 NOTE — PROGRESS NOTES
[facility] Hospitalist Progress Note     CC: Hospital Follow up    PCP: Danis Arauz MD       Assessment/Plan:     Principal Problem:    Alcohol withdrawal seizure without complication [facility])  Active Problems:     Thrombocytopenia ([address]    Alcohol withdrawal (H psych     Portal Hypertensive Gastropathy/Grade 1 Esophageal Varices/Hepatic Steatosis/Hepatospenomegaly   -prior labs with elevated AST and ALT, elevated total bili and direct bili,   -hepatitis panel negative  -US abdomen- mild hepatosplenomegaly with he intact  Psych: Affect- normal  SKIN: warm, dry  EXT: no edema    Data Review:       Labs:     Recent Labs   Lab 05/16/19  1107 05/17/19  0647 05/18/19  0747   RBC 4.44 4.11* 4.18*   HGB 15.2 14.1 14.4   HCT 44.2 40.4 42.1   MCV 99.5 98.3 100.7*   MCH 34.2*

## 2019-05-18 NOTE — PLAN OF CARE
Problem: Patient Centered Care  Goal: Patient preferences are identified and integrated in the patient's plan of care  Description  Interventions:  - What would you like us to know as we care for you?  \" I live in an apartment with my sister, I do not wa Problem: RISK FOR INFECTION - ADULT  Goal: Absence of fever/infection during anticipated neutropenic period  Description  INTERVENTIONS  - Monitor WBC  - Administer growth factors as ordered  - Implement neutropenic guidelines  Outcome: Progressing     P

## 2019-05-18 NOTE — CONSULTS
[facility] HOSP - [facility]    Report of Consultation    Alfonso Landisly Patient Status:  Inpatient    1970 MRN [id number]   Location [facility] 5SW/SE Attending Jacob Kayser, MD   Hosp Day # 1 PCP Yudith Batista MD     Date of Admissio or agitated today. Patient admitted that he has been feeling depressed but this is not new to him and he gets adjusted with his current situation. Patient denying any homicidal or suicidal ideation. Patient deny any auditory or visual hallucination. History  Social History    Tobacco Use      Smoking status: Current Every Day Smoker        Years: 20.00        Types: Cigars      Smokeless tobacco: Current User        Types: Chew    Alcohol use: Yes      Comment: per pt, only on weekends    Drug use: Campos Zavala 1,000 capsules by mouth daily. Multiple Vitamins-Minerals (MULTI-VITAMIN/MINERALS) Oral Tab Take 1 tablet by mouth daily. folic acid 1 MG Oral Tab Take 1 tablet (1 mg total) by mouth daily.        Allergies    Phenergan [Prometha*          Review of Sys ideation. Patient denying any auditory visual hallucination. Cognition is fair for his condition. Judgment and insight are impaired for continuation of drinking and impairment in his function.     Impression:   Impression:      Alcoholic withdrawal seizu

## 2019-05-19 NOTE — PROGRESS NOTES
Scheduled Cab with [employer] for pt. IV removed, paperwork and prescriptions provided to pt. Pt stated \"I have two sisters that can help me get my prescriptions. \" Pt taken to door 28 per PCT to meet cab.

## 2019-05-19 NOTE — DISCHARGE SUMMARY
General Medicine Discharge Summary     Patient ID:  Rita Orantes  54year old  4/12/1970    Admit date: 5/16/2019    Discharge date and time: 05/19/19    Attending Physician:  Alvarez Hu MD    Primary Care Physician: Gary Winn MD     Reason f prednisone 60 mg daily for possible ITP. PLT > 36k on d/c. Follow-up with PCP and hematology in 1 week.  Repeat CBC.      Acute ETOH Withdrawal- Chronic ETOH Use/Abuse-  - has had prior admission in the past for DT and seizures, required precedex gtt 1/2018 Soft, non-tender, non-distended, +BS  Neuro: Grossly normal, CN intact, sensory intact  Psych: Affect- normal  SKIN: warm, dry  EXT: no edema    Operative Procedures:      Imaging:       Disposition: home    Home Medication Changes:          Medication Lis follow-up with PCP and hematology to check your platelet level, if decreasing, they might start a steroid called prednisone. Please have labs checked in 1 week.        Follow-up with labs: CBC    Total Time Coordinating Care: Greater than 30 minutes    Chacha

## 2019-06-02 PROBLEM — S09.90XA INJURY OF HEAD, INITIAL ENCOUNTER: Status: ACTIVE | Noted: 2019-01-01

## 2019-06-02 PROBLEM — E87.6 HYPOKALEMIA: Status: ACTIVE | Noted: 2019-01-01

## 2019-06-02 PROBLEM — S01.81XA FACIAL LACERATION, INITIAL ENCOUNTER: Status: ACTIVE | Noted: 2019-01-01

## 2019-06-02 PROBLEM — F10.239 ALCOHOL WITHDRAWAL SYNDROME WITH COMPLICATION (HCC): Status: ACTIVE | Noted: 2019-01-01

## 2019-06-02 PROBLEM — R73.9 HYPERGLYCEMIA: Status: ACTIVE | Noted: 2019-01-01

## 2019-06-02 PROBLEM — H54.7 BLINDNESS: Status: ACTIVE | Noted: 2019-01-01

## 2019-06-02 PROBLEM — S09.90XA HEAD INJURY: Status: ACTIVE | Noted: 2019-01-01

## 2019-06-03 NOTE — ED INITIAL ASSESSMENT (HPI)
Pt brought in by EMS after hitting head on cabinet, pt states he hit his head around midnight last night. Pt admits to daily ETOH use, last drink was around midnight, pt denies any seizure hx. Pt has lac above R eye, bleeding controlled.  Pt c/o dizziness a

## 2019-06-03 NOTE — ED PROVIDER NOTES
Patient Seen in: [facility] 3w/sw    History   Patient presents with:  Fall (musculoskeletal, neurologic)  Laceration Abrasion (integumentary)    Stated Complaint: Pt has R eyebrow lac after hitting head on cabinet.     HPI    Patient presents the June 24 1972, July 1972   • LUMBAR / TRANSFORAMINAL EPIDURAL STEROID INJECTION N/A 3/12/2018    Performed by Herlinda Stewart DO at [facility]       Family history reviewed and is not pertinent to presenting problem.     Social History    Tob exam.   Skin: No rash noted. There is a 1 cm laceration of the right eyebrow with mild active bleeding. Nursing note and vitals reviewed.             ED Course     Labs Reviewed   BASIC METABOLIC PANEL (8) - Abnormal; Notable for the following component hematoma. Stable small upper posterior parietal scalp subcutaneous hematoma. There is mild diffuse  cortical atrophy more prominent than expected for age. Correlate clinically.     Dictated by (CST): Clifford Lawson MD on 6/02/2019 at 21:09     Approved b specified. We recommend that you schedule follow up care with a primary care provider within the next three months to obtain basic health screening including reassessment of your blood pressure.     Medications Prescribed:  Current Discharge Medication Lis

## 2019-06-03 NOTE — CONSULTS
Hematology/Oncology Consult Note        NAME: Josefina Esqueda - ROOM: 705/004-G - MRN: [id number] - Age: 54year old - : 1970    Reason for Consult:   Thrombocytopenia    Patient is a 54 y.o male,blind, history of etoh abuse who presents on this admiss Types: Chew    Alcohol use: Yes      Comment: per pt, only on weekends    Medications:  • predniSONE  60 mg Oral Daily with breakfast   • Thiamine HCl  100 mg Oral Daily   • multivitamin with minerals  1 tablet Oral Daily   • folic acid  1 mg Oral Daily had their questions answered to their satisfaction. Thank you for allowing me to participate in the care of this patient, please call with any questions.     Burgess Jesus M.D.  [facility] Hematology and Oncology

## 2019-06-03 NOTE — H&P
[facility] Hospitalist H&P       CC: Patient presents with:  Fall (musculoskeletal, neurologic)  Laceration Abrasion (integumentary)       PCP: Abran Ricks MD    Date of Admission: 6/2/2019  7:44 PM    ASSESSMENT / PLAN:     Mr. Ping Romo is a 50 yo mal with elevated AST and ALT, elevated total bili and direct bili,   -hepatitis panel negative  -US abdomen- mild hepatosplenomegaly with hepatic steatosis  -EGD/Colon- portal hypertensive gastropathy, grade 1 esophageal varix  -continue to reinforce no ETOH stopping    PMH  Past Medical History:   Diagnosis Date   • Alcoholism ([facility] [address] 3/5/2018   • Back problem    • Blind    • Chronic anemia 1/24/2019   • Depression    • ETOH abuse    • Facet arthritis of lumbar region 1/24/2019   • Glaucoma    • High blood pres kg)   SpO2 98%   BMI 23.67 kg/m²     Exam   GEN: male in NAD  HEENT: Blind, bruising around R eye  Neck: Supple   Pulm: CTAB, no crackles or wheezes  CV: RRR, no murmurs   ABD: Soft, non-tender, non-distended, +BS  Neuro: Grossly normal, CN intact, sensory fracture. Mild right preseptal and right supraorbital subcutaneous hematoma/soft tissue swelling. Chronic changes of both globes as discussed stable.     Dictated by (CST): Alec Garcia MD on 6/02/2019 at 21:27     Approved by (CST): Alec Garcia MD o

## 2019-06-04 NOTE — PLAN OF CARE
Problem: Patient/Family Goals  Goal: Patient/Family Long Term Goal  Description  Patient's Long Term Goal:     Interventions:  -  - See additional Care Plan goals for specific interventions  Outcome: Progressing  Goal: Patient/Family Short Term Goal  Eric administer replacement therapy as ordered  Outcome: Progressing     Problem: SAFETY ADULT - FALL  Goal: Free from fall injury  Description  INTERVENTIONS:  - Assess pt frequently for physical needs  - Identify cognitive and physical deficits and behaviors

## 2019-06-04 NOTE — PROGRESS NOTES
[facility] Hospitalist Progress Note     CC: Hospital Follow up    PCP: Delmis Fortune MD       Assessment/Plan:     Principal Problem:    Injury of head, initial encounter  Active Problems:    Hypokalemia    Hyperglycemia    Head injury    Alcohol withdrawal s Hypertensive Gastropathy/Grade 1 Esophageal Varices/Hepatic Steatosis/Hepatospenomegaly   -prior labs with elevated AST and ALT, elevated total bili and direct bili,   -hepatitis panel negative  -US abdomen- mild hepatosplenomegaly with hepatic steatosis eye  Neck: Supple   Pulm: CTAB, no crackles or wheezes  CV: RRR, no murmurs   ABD: Soft, non-tender, non-distended, +BS  Neuro: Grossly normal, CN intact, sensory intact  Psych: Affect- normal  SKIN: warm, dry  EXT: no edema      Data Review:       Labs: prevertebral soft tissue swelling. Moderately severe spondylosis at C6-C7 as discussed above with moderate multifactorial spinal canal stenosis. Narrowing of the neural foramina as discussed above. See  above.     Dictated by (CST): Celine Johnson MD on

## 2019-06-04 NOTE — PLAN OF CARE
Problem: Patient/Family Goals  Goal: Patient/Family Long Term Goal  Description  Patient's Long Term Goal: Discharge home    Interventions:  - Increase activity as tolerated  - Hematology consult for ITP  - Monitor blood pressure  - See additional Care P environment to reduce risk of injury  - Provide assistive devices as appropriate  - Consider OT/PT consult to assist with strengthening/mobility  - Encourage toileting schedule  - Bed locked in lowest position  - Call light and belongings within reach  - F

## 2019-06-05 NOTE — DISCHARGE SUMMARY
General Medicine Discharge Summary     Patient ID:  Luda Jensen  54year old  4/12/1970    Admit date: 6/2/2019    Discharge date and time: 06/05/19    Attending Physician: Essence Keller MD     Primary Care Physician: Luisa Oneal MD     Reason f thrombocytopenia who presented with laceration over his eye and withdrawal symptoms. S/p sutures in ED, placed on CIWA with mild withdrawal symptoms, improved.  PLT 31 on admit, hematology consulted, recommended starting prednisone 60 mg daily, stayed stabl steatosis  -EGD/Colon- portal hypertensive gastropathy, grade 1 esophageal varix  -continue to reinforce no ETOH     GOC  - CODE- FULL  - POA- sister Lavonne- updated on 6/3    Exam   GEN: male in NAD  HEENT: Blind, bruising around R eye  Neck: Supple   Pulm: taking these medications    folic acid 1 MG Tabs  Commonly known as:  FOLVITE  Take 1 tablet (1 mg total) by mouth daily. QUEtiapine Fumarate 50 MG Tabs  Commonly known as:  SEROQUEL  Take 1 tablet (50 mg total) by mouth nightly.      Thiamine HCl 100 M

## 2019-06-05 NOTE — PLAN OF CARE
Problem: Patient/Family Goals  Goal: Patient/Family Long Term Goal  Description  Patient's Long Term Goal: Discharge home    Interventions:  - Increase activity as tolerated  - Hematology consult for ITP  - Monitor blood pressure  - See additional Care P monitor vital signs, obtain 12 lead EKG if indicated  - Evaluate effectiveness of antiarrhythmic and heart rate control medications as ordered  - Initiate emergency measures for life threatening arrhythmias  - Monitor electrolytes and administer replacemen 6 for greater than 24 hours, protocol discontinued. Pt still with mild tremors. PO prednisone for ITP, hematology following. Pt voiding per urinal. Ambulating with a stand by assist. No complaints overnight.  Call light/belongings within reach, bed locked a

## 2019-06-05 NOTE — PROGRESS NOTES
Hematology/Oncology follow up note      plts at 39 today   Past Medical History:   Diagnosis Date   • Alcoholism ([facility] [address] 3/5/2018   • Back problem    • Blind    • Chronic anemia 1/24/2019   • Depression    • ETOH abuse    • Facet arthritis of lumbar region 1 Pulse 98   Temp 98.2 °F (36.8 °C) (Oral)   Resp 18   Ht 1.803 m (5' 11\")   Wt 74.1 kg (163 lb 4.8 oz)   SpO2 98%   BMI 22.78 kg/m²   Physical Exam:   General: alert, cooperative, no respiratory distress.    Head: right eye with bruise, no active bleeding, call with any questions.     Dale Herrera M.D.  [facility] Hematology and Oncology

## 2019-06-05 NOTE — PLAN OF CARE
Problem: Patient/Family Goals  Goal: Patient/Family Long Term Goal  Description  Patient's Long Term Goal: Discharge home    Interventions:  - Increase activity as tolerated  - Hematology consult for ITP  - Monitor blood pressure  - See additional Care P baseline  Description  INTERVENTIONS:  - Continuous cardiac monitoring, monitor vital signs, obtain 12 lead EKG if indicated  - Evaluate effectiveness of antiarrhythmic and heart rate control medications as ordered  - Initiate emergency measures for life t internal bleeding  - Monitor lab trends  Outcome: Adequate for Discharge     Discharge orders obtained. Discharge instructions read out loud to patient. Pt aware that he needs to follow up with hematology in a week along with his PCP in two weeks.  Pts sist

## 2019-07-16 ENCOUNTER — HOSPITAL (OUTPATIENT)
Dept: OTHER | Age: 49
End: 2019-07-16

## 2019-07-16 LAB
ALBUMIN SERPL-MCNC: 3.6 G/DL (ref 3.6–5.1)
ALBUMIN/GLOB SERPL: 0.7 {RATIO} (ref 1–2.4)
ALP SERPL-CCNC: 83 UNITS/L (ref 45–117)
ALT SERPL-CCNC: 76 UNITS/L
AMMONIA PLAS-SCNC: 19 MCMOL/L
AMPHETAMINES UR QL SCN>500 NG/ML: NEGATIVE
AMPHETAMINES UR QL SCN>500 NG/ML: NORMAL
ANALYZER ANC (IANC): ABNORMAL
ANION GAP SERPL CALC-SCNC: 7 MMOL/L (ref 10–20)
AST SERPL-CCNC: 56 UNITS/L
BARBITURATES UR QL SCN>200 NG/ML: NEGATIVE
BARBITURATES UR QL SCN>200 NG/ML: NORMAL
BASOPHILS # BLD: 0.1 K/MCL (ref 0–0.3)
BASOPHILS NFR BLD: 1 %
BENZODIAZ UR QL SCN>200 NG/ML: NEGATIVE
BENZODIAZ UR QL SCN>200 NG/ML: NORMAL
BILIRUB SERPL-MCNC: 0.4 MG/DL (ref 0.2–1)
BUN SERPL-MCNC: 6 MG/DL (ref 6–20)
BUN/CREAT SERPL: 8 (ref 7–25)
BZE UR QL SCN>150 NG/ML: NEGATIVE
BZE UR QL SCN>150 NG/ML: NORMAL
CALCIUM SERPL-MCNC: 8.9 MG/DL (ref 8.4–10.2)
CANNABINOIDS UR QL SCN>50 NG/ML: NEGATIVE
CANNABINOIDS UR QL SCN>50 NG/ML: NORMAL
CHLORIDE SERPL-SCNC: 110 MMOL/L (ref 98–107)
CO2 SERPL-SCNC: 31 MMOL/L (ref 21–32)
CREAT SERPL-MCNC: 0.77 MG/DL (ref 0.67–1.17)
DIFFERENTIAL METHOD BLD: ABNORMAL
EOSINOPHIL # BLD: 0.2 K/MCL (ref 0.1–0.5)
EOSINOPHIL NFR BLD: 2 %
ERYTHROCYTE [DISTWIDTH] IN BLOOD: 12.3 % (ref 11–15)
ETHANOL SERPL-MCNC: 440 MG/DL
GLOBULIN SER-MCNC: 4.9 G/DL (ref 2–4)
GLUCOSE SERPL-MCNC: 105 MG/DL (ref 65–99)
HCT VFR BLD CALC: 50 % (ref 39–51)
HGB BLD-MCNC: 17.2 G/DL (ref 13–17)
IMM GRANULOCYTES # BLD AUTO: 0 K/MCL (ref 0–0.2)
IMM GRANULOCYTES NFR BLD: 0 %
INR PPP: 1.1
INR PPP: NORMAL
LIPASE SERPL-CCNC: 556 UNITS/L (ref 73–393)
LYMPHOCYTES # BLD: 4.2 K/MCL (ref 1–4.8)
LYMPHOCYTES NFR BLD: 58 %
MAGNESIUM SERPL-MCNC: 2.2 MG/DL (ref 1.7–2.4)
MCH RBC QN AUTO: 33.4 PG (ref 26–34)
MCHC RBC AUTO-ENTMCNC: 34.4 G/DL (ref 32–36.5)
MCV RBC AUTO: 97.1 FL (ref 78–100)
MONOCYTES # BLD: 0.4 K/MCL (ref 0.3–0.9)
MONOCYTES NFR BLD: 5 %
NEUTROPHILS # BLD: 2.5 K/MCL (ref 1.8–7.7)
NEUTROPHILS NFR BLD: 34 %
NEUTS SEG NFR BLD: ABNORMAL %
NRBC (NRBCRE): 0 /100 WBC
OPIATES UR QL SCN>300 NG/ML: NEGATIVE
OPIATES UR QL SCN>300 NG/ML: NORMAL
PCP UR QL SCN>25 NG/ML: NEGATIVE
PCP UR QL SCN>25 NG/ML: NORMAL
PCP UR QL SCN>25 NG/ML: NORMAL
PLATELET # BLD: 138 K/MCL (ref 140–450)
POTASSIUM SERPL-SCNC: 3.9 MMOL/L (ref 3.4–5.1)
PROT SERPL-MCNC: 8.5 G/DL (ref 6.4–8.2)
PROTHROMBIN TIME (PRT2): NORMAL
PROTHROMBIN TIME: 11.4 SEC (ref 9.7–11.8)
RBC # BLD: 5.15 MIL/MCL (ref 4.5–5.9)
SODIUM SERPL-SCNC: 144 MMOL/L (ref 135–145)
WBC # BLD: 7.3 K/MCL (ref 4.2–11)

## 2019-07-17 ENCOUNTER — HOSPITAL (OUTPATIENT)
Dept: OTHER | Age: 49
End: 2019-07-17

## 2019-07-17 LAB
ALBUMIN SERPL-MCNC: 3.3 G/DL (ref 3.6–5.1)
ALBUMIN/GLOB SERPL: 0.7 {RATIO} (ref 1–2.4)
ALP SERPL-CCNC: 78 UNITS/L (ref 45–117)
ALT SERPL-CCNC: 64 UNITS/L
ANALYZER ANC (IANC): ABNORMAL
ANION GAP SERPL CALC-SCNC: 14 MMOL/L (ref 10–20)
AST SERPL-CCNC: 55 UNITS/L
BASOPHILS # BLD: 0 K/MCL (ref 0–0.3)
BASOPHILS NFR BLD: 1 %
BILIRUB SERPL-MCNC: 0.4 MG/DL (ref 0.2–1)
BUN SERPL-MCNC: 6 MG/DL (ref 6–20)
BUN/CREAT SERPL: 12 (ref 7–25)
CALCIUM SERPL-MCNC: 8.2 MG/DL (ref 8.4–10.2)
CHLORIDE SERPL-SCNC: 111 MMOL/L (ref 98–107)
CO2 SERPL-SCNC: 25 MMOL/L (ref 21–32)
CREAT SERPL-MCNC: 0.52 MG/DL (ref 0.67–1.17)
DIFFERENTIAL METHOD BLD: ABNORMAL
EOSINOPHIL # BLD: 0.1 K/MCL (ref 0.1–0.5)
EOSINOPHIL NFR BLD: 1 %
ERYTHROCYTE [DISTWIDTH] IN BLOOD: 12 % (ref 11–15)
GLOBULIN SER-MCNC: 4.6 G/DL (ref 2–4)
GLUCOSE SERPL-MCNC: 67 MG/DL (ref 65–99)
HCT VFR BLD CALC: 48 % (ref 39–51)
HGB BLD-MCNC: 16.5 G/DL (ref 13–17)
IMM GRANULOCYTES # BLD AUTO: 0 K/MCL (ref 0–0.2)
IMM GRANULOCYTES NFR BLD: 0 %
LIPASE SERPL-CCNC: 119 UNITS/L (ref 73–393)
LYMPHOCYTES # BLD: 2.3 K/MCL (ref 1–4.8)
LYMPHOCYTES NFR BLD: 42 %
MCH RBC QN AUTO: 33.4 PG (ref 26–34)
MCHC RBC AUTO-ENTMCNC: 34.4 G/DL (ref 32–36.5)
MCV RBC AUTO: 97.2 FL (ref 78–100)
MONOCYTES # BLD: 0.3 K/MCL (ref 0.3–0.9)
MONOCYTES NFR BLD: 6 %
NEUTROPHILS # BLD: 2.7 K/MCL (ref 1.8–7.7)
NEUTROPHILS NFR BLD: 50 %
NEUTS SEG NFR BLD: ABNORMAL %
NRBC (NRBCRE): 0 /100 WBC
PLATELET # BLD: 95 K/MCL (ref 140–450)
PLATELET # BLD: ABNORMAL 10*3/UL
POTASSIUM SERPL-SCNC: 3.7 MMOL/L (ref 3.4–5.1)
PROT SERPL-MCNC: 7.9 G/DL (ref 6.4–8.2)
RBC # BLD: 4.94 MIL/MCL (ref 4.5–5.9)
SODIUM SERPL-SCNC: 146 MMOL/L (ref 135–145)
WBC # BLD: 5.4 K/MCL (ref 4.2–11)

## 2019-07-17 PROCEDURE — 90792 PSYCH DIAG EVAL W/MED SRVCS: CPT | Performed by: PSYCHIATRY & NEUROLOGY

## 2019-08-24 NOTE — ED PROVIDER NOTES
Patient Seen in: [facility] Emergency Department    History   Patient presents with:  Abdomen/Flank Pain (GI/)    Stated Complaint:     HPI    Pt is 53 yo M who p/w generalized abdominal pain that radiates to left side x 3 days. Rates 8/10.  No r May             Review of Systems    Positive for stated complaint:   Other systems are as noted in HPI. Constitutional and vital signs reviewed. All other systems reviewed and negative except as noted above.     Physical Exam     ED Triage Vitals   B Absolute 9.07 (*)     Lymphocyte Absolute 0.48 (*)     All other components within normal limits   MAGNESIUM - Normal   CBC WITH DIFFERENTIAL WITH PLATELET    Narrative:      The following orders were created for panel order CBC WITH DIFFERENTIAL WITH PLATE a total of 60 minutes of critical care time in obtaining history, performing a physical exam, bedside monitoring of interventions, collecting and interpreting tests and discussion with consultants but not including time spent performing procedures.     D/w

## 2019-08-24 NOTE — ED INITIAL ASSESSMENT (HPI)
Pt with hx of etoh abuse here c/o LLQ abd pain, states he quit drinking cold turkey x1 week ago and has not been feeling well since.

## 2019-08-25 PROBLEM — K85.90 ACUTE PANCREATITIS, UNSPECIFIED COMPLICATION STATUS, UNSPECIFIED PANCREATITIS TYPE: Status: ACTIVE | Noted: 2019-01-01

## 2019-08-25 PROBLEM — E87.2 METABOLIC ACIDOSIS: Status: ACTIVE | Noted: 2019-01-01

## 2019-08-25 PROBLEM — E87.1 HYPONATREMIA: Status: ACTIVE | Noted: 2019-01-01

## 2019-08-25 NOTE — H&P
[facility] Hospitalist H&P       CC: Patient presents with:  Abdomen/Flank Pain (GI/)       PCP: Denton Litten, MD    Date of Admission: 8/24/2019  5:37 PM    ASSESSMENT / PLAN:     Mr. 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     Additional Diagnostics: E

## 2019-08-25 NOTE — ED NOTES
Patient received in sign out. Became agitated and tachycardic. C/w alcohol withdrawal. Patient given ativan and will be upgraded to PCU. D/w Dr Regla Grimes.

## 2019-08-25 NOTE — CONSULTS
[facility]    Report of Consultation    Ashlie Dean Patient Status:  Inpatient    1970 MRN [id number]   Location [facility] 2W/SW Attending Mellissa Gimenez MD   Hosp Day # 0 PCP Serenity Bee MD     Date of Admission: Laterality Date   • ANKLE OPEN REDUCTION INTERNAL FIXATION Left 11/2/2018    Performed by Pam Kowalski MD at [address]   • COLONOSCOPY  02/2018   • COLONOSCOPY N/A 2/7/2018    Performed by Rogelio Hirsch MD at [address] [place] [address] ENDOSCOPY   • EGD  02/2018   • 75 MG Oral Capsule SR 24 Hr Take 1 capsule (75 mg total) by mouth daily. QUEtiapine Fumarate 50 MG Oral Tab Take 1 tablet (50 mg total) by mouth nightly. Thiamine HCl 100 MG Oral Tab Take 1 tablet (100 mg total) by mouth daily.    folic acid 1 MG Oral T Imaging:  Ct Abdomen+pelvis(contrast Only)(cpt=74177)    Result Date: 8/24/2019  PROCEDURE: CT ABDOMEN + PELVIS (CONTRAST ONLY) (AAD=51187)  COMPARISON: [address] [place] [address] (CPT=76700), 1/30/2018, 18:02.   INDICATIONS: Left l enlarged adenopathy. BOWEL/MESENTERY:  Appendix not visualized. Mild fluid-filled dilatation of distal small bowel nonspecific without well-defined bowel obstruction. Findings may suggest localized ileus. ABDOMINAL WALL: Normal.  No mass or hernia.  URIN hydration  Follow labs/electrolytes closely    Time spent in direct patient contact and decision making as well as counseling/coordination of care:  50 minutes  Thank you for allowing me to participate in the care of your patient. Lonza Emre Wayne Prior, [facility]

## 2019-08-25 NOTE — ED NOTES
Assumed care to this pt, received report from [facility], per report given pt came in with complaints of abdominal pain, N/V and alcohol withdrawal. Pt medicated with Ativan by Diandra Rivas RN for withdrawal, latest CIWA ar 5.will continue to monitor.

## 2019-08-25 NOTE — PLAN OF CARE
Problem: Patient Centered Care  Goal: Patient preferences are identified and integrated in the patient's plan of care  Description  Interventions:  - What would you like us to know as we care for you?  - Provide timely, complete, and accurate information of pulses, skin color and temperature  - Assess for signs of decreased coronary artery perfusion - ex.  Angina  - Evaluate fluid balance, assess for edema, trend weights  Outcome: Progressing  Goal: Absence of cardiac arrhythmias or at baseline  Description preferences  - Enhance eating environment  Outcome: Progressing     Problem: METABOLIC/FLUID AND ELECTROLYTES - ADULT  Goal: Electrolytes maintained within normal limits  Description  INTERVENTIONS:  - Monitor labs and rhythm and assess patient for signs a traced, mepliex placed over site to protect it, mepilex on sacrum, buttocks red, emtoinoal support,npo at this time. Freq rounding for safety.  Pt

## 2019-08-25 NOTE — ED NOTES
Orders for admission, patient is aware of plan and ready to go upstairs. Any questions, please call ED RN Edmar Wyatt  at extension [phone]    Pt a/o x4, blind, ambulatory with assist, continent.

## 2019-08-25 NOTE — CONSULTS
[facility]    Report of Consultation    [facility] Level Patient Status:  Inpatient    1970 MRN [id number]   Location [facility] 2W/SW Attending Ashia Moreno MD   Hosp Day # 0 PCP Quincy Riojas MD       [address] EXTREMITY LOWER CLOSED REDUCTION WITH PINNING Left 10/27/2018    Performed by Racheal Chapman., MD at [address] [place] [address] OR   • EYE SURGERY  June 24 1974, July 1974   • LUMBAR / TRANSFORAMINAL EPIDURAL STEROID INJECTION N/A 3/12/2018    Performed by Mark Estrella  5' 11\" (1.803 m)   Wt 169 lb (76.7 kg)   SpO2 96%   BMI 23.57 kg/m²   Temp (24hrs), Av.9 °F (36.6 °C), Min:97.5 °F (36.4 °C), Max:98.2 °F (36.8 °C)       Intake/Output Summary (Last 24 hours) at 2019 1223  Last data filed at 2019 2258 Results   Component Value Date    COLORUR Yellow 05/16/2019    CLARITY Clear 05/16/2019    SPECGRAVITY 1.017 05/16/2019    GLUUR Negative 05/16/2019    BILUR Negative 05/16/2019    KETUR 20  (A) 05/16/2019    BLOODURINE Moderate (A) 05/16/2019    PHURINE 6 Capri Foote    Thank you for allowing me to participate in the care of your patient. Please do not hesitate to contact me with concerns or questions. Jose A Flowers, [address] [facility] Nephrology  [facility] 93  [address] [facility]  Tonja, [address] [facility]

## 2019-08-26 PROBLEM — J96.90 RESPIRATORY FAILURE (HCC): Status: ACTIVE | Noted: 2019-01-01

## 2019-08-26 NOTE — H&P
[facility] Hospitalist Progress Note         CC: Patient presents with:  Abdomen/Flank Pain (GI/)       PCP: Lola Crowder MD    Date of Admission: 8/24/2019  5:37 PM    ASSESSMENT / PLAN:     Mr. Leoncio Myrick is a 57 yo male with hx blindness, HTN, ETOH a psych has seen on prior admissions, will consult if needed    Hyponatremia  - Na 127 on admit, down to 124->128->134 with increase of IVF  - renal consulted, appreciate recs  - check urine studies    ANEL  - Cr 1.16 on admit, up from 0.6 baseline  - repeat note understanding and agreeing with therapeutic plan as outlined    Clarita Fischer MD  [facility] Hospitalist  Answering service: [phone]         Time spent with patient: >35 mins with > 50% spent counseling patient face to face      Subjective     Respon LORazepam    Data Review:       Labs:     Recent Labs   Lab 08/24/19  1735 08/25/19  0737 08/26/19  0406   WBC 10.7 10.7 7.3   HGB 17.3 17.7* 14.6   MCV 90.4 91.7 92.8   PLT 25.0* 25.0* 25.0*   INR  --  1.44*  --        Recent Labs   Lab 08/24/19  1735 08/ on 8/24/2019 at 20:15

## 2019-08-26 NOTE — PROGRESS NOTES
[facility] Hospitalist Progress Note         CC: Patient presents with:  Abdomen/Flank Pain (GI/)       PCP: Estefani Cotton MD    Date of Admission: 8/24/2019  5:37 PM    ASSESSMENT / PLAN:     Mr. Malinda Lange is a 55 yo male with hx blindness, HTN, ETOH a psych has seen on prior admissions, will consult if needed    Hyponatremia  - Na 127 on admit, down to 124->128->134 with increase of IVF  - renal consulted, appreciate recs  - check urine studies    ANEL  - Cr 1.16 on admit, up from 0.6 baseline  - repeat note understanding and agreeing with therapeutic plan as outlined    Betito Nielson MD  [facility] Hospitalist  Answering service: [phone]         Time spent with patient: >35 mins with > 50% spent counseling patient face to face      Subjective     Respon Review:       Labs:     Recent Labs   Lab 08/24/19  1735 08/25/19  0737 08/26/19  0406   WBC 10.7 10.7 7.3   HGB 17.3 17.7* 14.6   MCV 90.4 91.7 92.8   PLT 25.0* 25.0* 25.0*   INR  --  1.44*  --        Recent Labs   Lab 08/24/19  1735 08/25/19  0737 08/25/ 20:07     Approved by (CST): Kylie Beth MD on 8/24/2019 at 20:15          Xr Chest Ap Portable  (cpt=71045)    Result Date: 8/26/2019  CONCLUSION:  1. Suboptimal inspiration.   Moderate left base consolidation/atelectasis with suspect small left pleural

## 2019-08-26 NOTE — PROGRESS NOTES
[facility]OSP - [facility]      Gastroenterology Progress Note    Moni Keith Patient Status:  Inpatient    1970 MRN [id number]   Location [facility] 2W/SW Attending Rayne Baldwin MD   Hosp Day # 1 PCP Jeff Núñez MD     Subjective and left anterior perirenal spaces, but no large pseudocyst is seen. Question of a poorly defined low-density mass in the pancreatic head measuring 2.7 x 2.3 cm. Neoplasm must be excluded. Recommend follow-up MRI with contrast to further assess.  2. Ther type      Assessment:  Chronic alcoholic cirrhosis with chronic thrombocytopenia  Active alcoholism with no plans to stop drinking  First episode of interstitial alcoholic pancreatitis  Presently in CIWA protocol and sedated     Plan  -Family has made poli

## 2019-08-26 NOTE — PROGRESS NOTES
[facility]    Progress Note    Sandy Araujo Patient Status:  Inpatient    1970 MRN [id number]   Location [facility] 2W/SW Attending Kris Mccormack MD   Hosp Day # 1 PCP Kristine Cabezas MD       Subjective:     Brissa Henderson a poorly defined low-density mass in the pancreatic head measuring 2.7 x 2.3 cm. Neoplasm must be excluded. Recommend follow-up MRI with contrast to further assess. 2. There is diffuse fatty infiltration of an enlarged liver.   Grossly patent portal vein Low Phos levels.   -- Low vitamin D.  -- PTHrP and SPEP pending.  -- Continue IVF     HTN:  -- Currently hypotensive maintain MAPs >65     ETOH abuse/portal HTN, EV:  -- CIWA  -- per GI       Thank you for allowing me to participate in the care of your poli

## 2019-08-26 NOTE — PLAN OF CARE
8/25/19 1930 Patient sister Johanne Dejah here and signed POLST form. 8/25/19 2334 Called Dr. Luke Bernstein regarding HR in the 130's to 140's and RR in the 30's, shallow. Order received to start patient on Precedex drip.  On O2 at 4l/NC.  8/25/19 0049 Called Dr. Carine Adhikari

## 2019-08-26 NOTE — SIGNIFICANT EVENT
Patient with increasing tachypnea and tachycardia. Appears to be more uncomfortable. Still unresponsive. D/W Sisters at bedside in length with 3 different visits to pt at bedside on 8/26/19. Confirmed DNR/DNI.     They do not want him to comfort and [phone]

## 2019-08-26 NOTE — CONSULTS
Critical Care Consult     Assessment / Plan:  1. Acute respiratory failure - due to encephalopathy and aspiration pneumonia  - on vapotherm  - wean O2 as able  2. Aspiration pneumonia  - zosyn  3.  Etoh withdrawal  - CIWA  - benzos prn  - MVI, thiamine, fol 9708, July 1974   • LUMBAR / TRANSFORAMINAL EPIDURAL STEROID INJECTION N/A 3/12/2018    Performed by Bandar Jay DO at [facility]         Medications Prior to Admission:  nadolol 20 MG Oral Tab Take 1 tablet (20 mg total) by mouth daily tablet (100 mg total) by mouth daily. Disp: 30 tablet Rfl: 0   Cholecalciferol (VITAMIN D3) 70692 units Oral Cap Take 1,000 capsules by mouth daily.  Disp:  Rfl:         Phenergan [Prometha*       Social History    Tobacco Use      Smoking status: Current E

## 2019-08-26 NOTE — HOSPICE RN NOTE
Admit to GIP   DX acute respiratory failure  GIP for respiratory distress  Dr Marcella Schmitt and Dr Cody Garcia contacted as to [facility] admission, both agreeable. Consents signed by sister Colletta Gary contact info given to family, planning in process.   Check list o

## 2019-08-27 NOTE — DISCHARGE SUMMARY
[facility] Discharge Summary   Patient ID:  Alfonso Johnson  [id number]  87 year old  4/12/1970    Admit date: 8/26/2019    Discharge date and time: 8/27/19    Attending Physician: No att. providers found     Primary Care Physician: Cory Mahan pursue comfort measures and hospice at this time. Patient started on morphine gtt.   Passed away morning of 8/27/19    Discharge Diagnoses:   Hospice consult  Transition to comfort measures only  -Morphine gtt, titrate to comfort, start at 5mg given he is diffuse boggy enlargement of an edematous appearing pancreas. Findings are suggestive of interstitial pancreatitis but correlate with lipase values. No well-defined evidence of pancreatic necrosis at this time. Follow-up studies are  advised however. fatty infiltration of an enlarged liver. Grossly patent portal vein and splenic vein. Mild to moderate perihepatic ascites with mild fluid in the upper abdomen and pelvis. Mild fluid-filled dilatation of distal small bowel nonspecific.   No definite keshav

## 2019-08-27 NOTE — H&P
[facility] Hospitalist H&P     CC: hospice     PCP: Quincy Riojas MD      Assessment and Plan     Mr. Arthur Oliveros is a 55 yo male with hx blindness, HTN, ETOH abuse with hx of DTs and withdrawal seizures, pancytopenia 2:2 EtOH abuse, alcoholic cirrhosis, po Paola Mosher MD  [facility] Hospitalist      Answering Service number: [phone]    HPI     Patient with increasing tachypnea and tachycardia. Appears to be more uncomfortable.  Still unresponsive.     D/W Sisters at bedside in length with 3 different visits to p REDUCTION INTERNAL FIXATION Left 11/2/2018    Performed by Miracle Brown MD at [facility] MAIN OR   • COLONOSCOPY  02/2018   • COLONOSCOPY N/A 2/7/2018    Performed by Harrison Villa MD at [facility] ENDOSCOPY   • EGD  02/2018   • ESOPHAGOGASTRODUODENOSCOPY (EGD 128* 134* 138   K 2.4* 3.8  --  4.1 3.9 3.8   CL 90* 94*  --  97* 106 106   CO2 15.0* 16.0*  --  17.0* 16.0* 20.0*   BUN 15 17  --  22* 29* 23*   CREATSERUM 1.16 1.15 1.44* 1.47* 1.77* 1.22   * 294*  --  247* 225* 296*   CA 12.8* 12.0* 11.9* 11.0* 8 poorly defined low-density mass in the pancreatic head measuring 2.7 x 2.3 cm. Neoplasm must be excluded. Recommend MRI with contrast to further evaluate.   Moderate inflammatory reaction in the peripancreatic fat with  a small amount of fluid in the righ Findings may suggest ileus or enteritis. Moderate fluid in the left inguinal hernia.     Dictated by (CST): Chrissy Huston MD on 8/24/2019 at 20:07     Approved by (CST): Chrissy Huston MD on 8/24/2019 at 20:15          Xr Chest Ap Portable  (cpt=71045)

## 2019-08-27 NOTE — HOSPICE RN NOTE
Patient  at 923am per staff report from the PCU. Residential hospice will provide support to family.

## 2019-08-27 NOTE — PLAN OF CARE
Patient on hospice going to room 473 report given to Candido Avendano and transport on the way, placed patient on 15L NRB mask. No changes in status, sisters called and made aware of the transfer.  Skin check done at 7am today with Cristina Donahue PCT no changes on skin as

## 2019-08-27 NOTE — DISCHARGE SUMMARY
[facility] Discharge Summary   Patient ID:  Celena Armendariz  [id number]  11 year old  4/12/1970    Admit date: 8/24/2019    Discharge date and time: 8/26/2019  7:41 PM     Attending Physician: No att. providers found     Primary Care Physician: They relay that he would not want to continue in this state and would want to be made comfortable. They understand that transitioning to comfort measures that he would likely pass away in the next few hours to days.   They state his biggest fear is to suff was used. Adjustment of the mA and/or kV was done based on the patient's size. Use of iterative reconstruction technique for dose reduction was used.   Dose information is transmitted to the A[employer] ([employer]) Verna Centeno [address] [place] [address] Radiology Data left inguinal hernia. BONES:   No significant bony lesion or fracture. LUNG BASES: No visible pulmonary or pleural disease. OTHER: Negative. CONCLUSION:  1.  There is a diffuse boggy appearance to a diffusely edematous pancreas compatible with diff OTHER: Negative. CONCLUSION:  1. Suboptimal inspiration. Moderate left base consolidation/atelectasis with suspect small left pleural effusion. Can't exclude left basal pneumonia. Right lung field is clear.     Dictated by (CST): Gertrude Szymanski,

## 2019-08-28 LAB
DRUG CONFIRMATION,CANNABINOIDS: 83 NG/ML
OPIATES, UR, 6-ACETYLMORPHINE: <10 NG/ML
OPIATES, URINE, CODEINE: <20 NG/ML
OPIATES, URINE, HYDROCODONE: <20 NG/ML
OPIATES, URINE, HYDROMORPHONE: <20 NG/ML
OPIATES, URINE, MORPHINE: 514 NG/ML
OPIATES, URINE, NORHYDROCODONE: <20 NG/ML
OPIATES, URINE, NOROXYCODONE: <20 NG/ML
OPIATES, URINE, NOROXYMORPHONE: <20 NG/ML
OPIATES, URINE, OXYCODONE: <20 NG/ML
OPIATES, URINE, OXYMORPHONE: <20 NG/ML

## 2019-09-02 LAB — PTH RELATED PEPTIDE: <2 PMOL/L

## (undated) DEVICE — SUTURE ETHILON 3-0 669H

## (undated) DEVICE — ENDOSCOPY PACK - LOWER: Brand: MEDLINE INDUSTRIES, INC.

## (undated) DEVICE — SOL H2O 1000ML BTL

## (undated) DEVICE — STERILE LATEX POWDER-FREE SURGICAL GLOVESWITH NITRILE COATING: Brand: PROTEXIS

## (undated) DEVICE — LOWER EXTREMITY: Brand: MEDLINE INDUSTRIES, INC.

## (undated) DEVICE — TOWEL OR BLU 16X26 STRL

## (undated) DEVICE — CAST PADDING SYNTH 4

## (undated) DEVICE — STERILE TETRA-FLEX CF, ELASTIC BANDAGE, 4" X 5.5YD: Brand: TETRA-FLEX™CF

## (undated) DEVICE — SOL  .9 1000ML BTL

## (undated) DEVICE — CHLORAPREP 26ML APPLICATOR

## (undated) DEVICE — WIRE KRSH FX ORTH 4IN .045IN: Type: IMPLANTABLE DEVICE | Site: ANKLE

## (undated) DEVICE — DRAPE C-ARM UNIVERSAL

## (undated) DEVICE — PROXIMATE RH ROTATING HEAD SKIN STAPLERS (35 WIDE) CONTAINS 35 STAINLESS STEEL STAPLES: Brand: PROXIMATE

## (undated) DEVICE — BATTERY

## (undated) DEVICE — SUTURE VICRYL 0 CP-1

## (undated) DEVICE — SNARE CAPTIFLEX MICRO-OVL OLY

## (undated) DEVICE — Device: Brand: DEFENDO AIR/WATER/SUCTION AND BIOPSY VALVE

## (undated) DEVICE — ZIMMER® STERILE DISPOSABLE TOURNIQUET CUFF WITH PLC, DUAL PORT, SINGLE BLADDER, 30 IN. (76 CM)

## (undated) DEVICE — DRAPE SHEET LG

## (undated) DEVICE — WEBRIL COTTON UNDERCAST PADDING: Brand: WEBRIL

## (undated) DEVICE — COVER SLV UNV DISP NTR STRL LF

## (undated) DEVICE — SUTURE VICRYL 2-0 FS-1

## (undated) DEVICE — IMPLANTABLE DEVICE
Type: IMPLANTABLE DEVICE | Site: ANKLE | Status: NON-FUNCTIONAL
Removed: 2018-11-02

## (undated) DEVICE — C-ARMOR C-ARM EQUIPMENT COVERS CLEAR STERILE UNIVERSAL FIT 12 PER CASE: Brand: C-ARMOR

## (undated) DEVICE — SUTURE MONOCRYL 3-0 Y936H

## (undated) DEVICE — Device

## (undated) DEVICE — DRILL BIT ZIM 2.5 4806-110-25

## (undated) DEVICE — PADDING 4YDX6IN CTTN STRL WBRL

## (undated) DEVICE — TETRA-FLEX CF WOVEN LATEX FREE ELASTIC BANDAGE 6" X 5.5 YD: Brand: TETRA-FLEX™CF

## (undated) DEVICE — SUCTION CANISTER, 3000CC,SAFELINER: Brand: DEROYAL

## (undated) DEVICE — REM POLYHESIVE ADULT PATIENT RETURN ELECTRODE: Brand: VALLEYLAB

## (undated) DEVICE — NON-ADHERENT STRIPS,OIL EMULSION: Brand: CURITY

## (undated) DEVICE — GAUZE SPONGES,12 PLY: Brand: CURITY

## (undated) DEVICE — SPLINT PRECUT SYNTH 5X30

## (undated) DEVICE — SNARE OPTMZ PLPCTM TRP

## (undated) DEVICE — ENDOSCOPY PACK UPPER: Brand: MEDLINE INDUSTRIES, INC.

## (undated) NOTE — IP AVS SNAPSHOT
Patient Demographics     Address  [address] [place] [address] Phone  [phone] [facility])  [phone] [facility] E-mail Address  [email or web address]      Emergency Contact(s)     Name Relation Home Work Misty Alfaro [phone] Next dose due: Tonight before bed      Take 1 tablet (325 mg total) by mouth 2 (two) times daily with meals. Demian Swift, NP         folic acid 1 MG Tabs  Commonly known as:  Foy Jeans  Next dose due:   Tomorrow morning      Take 1 tablet (1 mg total) by Please  your prescriptions at the location directed by your doctor or nurse    Bring a paper prescription for each of these medications  aspirin 325 MG Tabs  docusate sodium 100 MG Caps  ferrous sulfate 325 (65 FE) MG Tbec  HYDROcodone-acetaminophen Temp  98 °F (36.7 °C) Filed at 11/05/2018 0455   SpO2  97 % Filed at 11/05/2018 0459      Patient's Most Recent Weight       Most Recent Value   Patient Weight  73.3 kg (161 lb 11.2 oz)         Lab Results Last 24 Hours      BASIC METABOLIC PANEL (8) [2028 Component Value Reference Range Flag Lab   Magnesium 1.8 1.8 - 2.5 mg/dL — [facility]            CBC WITH DIFFERENTIAL WITH PLATELET [[id number]]  Resulted: 11/05/18 0519, Result status: Final result   Ordering provider:  Araceli Bowles NP  11/04/18 230 meds  presumptive Subacute bacterial endocarditis with streptococcus mitus/oralis sepsis-completed ceftriaxone,  MSSA UTI, portal hypertensive gastropathy, grade 1 esophageal varix Who presents with a fall[GB.1]s pt found to have left medial and distal fib    Portal Hypertensive Gastropathy/Grade 1 Esophageal Varices/Hepatic Steatosis/Hepaospenomegaly   -elevated AST and ALT, elevated total bili and direct bili, continue to follow  -hepatitis panel negative  -US abdomen- mild hepatosplenomegaly with hepatic last drank this weekend. He is upset at on of his siblings as she took his dog away this weekend and asks me only to update his sister Elayne Velasco only and not David Mckeon. He denies CP, SOB.  He is picking at his face where he has a facial laceration[GB.2]    OBJECTIVE:[ Phenergan [Prometha*[GB.4]         Home Medications:[GB.1]    No outpatient medications have been marked as taking for the 10/25/18 encounter [facility] Encounter). [GB.4]    Soc Hx[GB. 1]  Social History    Tobacco Use      Smoking status: Current Every Day Ct Brain Or Head (61654)    Result Date: 10/25/2018  CONCLUSION:  Mild chronic microvascular ischemic disease, advanced for patient's age. Bilateral phthisis bulbi. No acute intracranial abnormality.     Dictated by (CST): Alayna Shi MD on 10/25/2018 at Gen: No acute distress, alert and oriented x3, bruises all over on face, cheek, chin  Neck Supple, no JVD  Pulm: Lungs clear, normal respiratory effort   CV: Heart with regular rate and rhythm   Abd: Abdomen soft, nontender, nondistended   MSK:  no clubbin -elevated AST and ALT, elevated total bili and direct bili, continue to follow  -hepatitis panel negative  -US abdomen- mild hepatosplenomegaly with hepatic steatosis  -EGD/Colon- portal hypertensive gastropathy, grade 1 esophageal varix  -continue to rein S/P Fall with Left Medial and[GB. 1] D[GB.2]istal[GB. 1] F[GB.2]ibula[GB. 1] F[GB.2]x  -CT head negative for acute process  -CT face-right soft tissue facial swelling and laceration  -xray left ankle- Comminuted fractures of the medial malleolus and distal fi Message left for Manda Lovelace per pt request, [phone]. Pt asks me not to update his sister Mary Wagoner[GB.4]  PCP:[GB.1] Ellyn Oh MD[GB.4]      Concerns regarding plan of care were discussed with patient.  Patient agrees with plan as detailed abov NEUROLOGIC: A/A  SKIN:[GB.1] bruises to arms and legs, hematoma to right cheek, ecchymosis to chin[GB. 2]  HEENT: normocephalic, normal nose, pharynx and TM's, sclera anicteric, conjunctiva normal  NECK: supple, no JVD, no carotid bruits   RESPIRATORY: norm • Hypertension Father    • Cancer Mother    • Other (smoker) Mother    • Other (alzheimers) Mother[GB. 4]        Review of Systems  A comprehensive 10 point review of systems was completed. Pertinent positives and negatives noted in the the HPI.   Cande Almodovar radiopaque foreign body, acute fracture, or drainable fluid collection. Age advanced carotid artery atherosclerosis. Chronic changes of the globes.   Dictated by (CST): Rafael Briceno MD on 10/25/2018 at 9:33     Approved by (CST): Rafael Briceno MD on admission, plans for platelet transfusion with expected OR plans. Pt placed on CIWA with chronic ETOH use and previous sz and withdrawal with alcohol abstinence, see below for details[GB. 2]     S/P Fall with Left Medial and[GB. 1] D[GB.2]istal[GB. 1] F[GB -continue to reinforce no ETOH     FEN  -lytes in am  -IVF  -diet-general     Prophy  -SCD  -no AC with low platelets    Dispo  -pending clinical coarse[GB. 1]  Message left for Dede Rinne per pt request, [phone].  Pt asks me not to update his sister John Dubois 23   Ht 180.3 cm (5' 11\")   Wt 160 lb (72.6 kg)   SpO2 96%   BMI 22.32 kg/m²[GB. 3]     GENERAL: no apparent distress[GB.1], tremulous, picking at face[GB. 2]  NEUROLOGIC: A/A  SKIN:[GB.1] bruises to arms and legs, hematoma to right cheek, ecchymosis to chi Types: Chew    Alcohol use: Yes      Comment: per pt, only on weekends[GB.3]       Fam Hx[GB.1]  Family History   Problem Relation Age of Onset   • Hypertension Father    • Cancer Mother    • Other (smoker) Mother    • Other (alzheimers) Mother[GB. 3] Ct Facial Bones (eeb=[id number])    Result Date: 10/25/2018  CONCLUSION:   Right facial soft tissue swelling and laceration. No radiopaque foreign body, acute fracture, or drainable fluid collection. Age advanced carotid artery atherosclerosis.   Chronic change -continue to reinforce no ETOH     FEN  -lytes in am  -IVF  -diet-general     Prophy  -SCD  -no AC with low platelets    Dispo  -pending clinical coarse  PCP:[GB.1] RISHABH MORALES MD[GB.3]      Concerns regarding plan of care were discussed with patient. GENERAL: no apparent distress[GB.1], tremulous, picking at face[GB. 2]  NEUROLOGIC: A/A  SKIN:[GB.1] bruises to arms and legs, hematoma to right cheek, ecchymosis to chin[GB. 2]  HEENT: normocephalic, normal nose, pharynx and TM's, sclera anicteric, conjunct Fam Hx[GB.1]  Family History   Problem Relation Age of Onset   • Hypertension Father    • Cancer Mother    • Other (smoker) Mother    • Other (alzheimers) Mother[GB. 3]        Review of Systems  A comprehensive 10 point review of systems was completed.   Per CONCLUSION:   Right facial soft tissue swelling and laceration. No radiopaque foreign body, acute fracture, or drainable fluid collection. Age advanced carotid artery atherosclerosis. Chronic changes of the globes.   Dictated by (CST): ALDEN Cox The patient seen for 60 minutes over 50% consulting and managing treatment plan. Record reviewed, communication with attending, communication with RN and patient seen face to face evaluation.     History of Present Illness:   Patient is a 57year old Cau During this assessment he was anxious, fidgeting, sweating, disoriented and at one point attempted to eat the hand held radio that was on his lap. He is currently on CIWAs.        Medical History:       Past Medical History  Past Medical History:   Diagnos Normal Saline Flush 0.9 % injection 10 mL 10 mL Intravenous PRN   0.9%  NaCl infusion  Intravenous Continuous   LORazepam (ATIVAN) tab 1 mg 1 mg Oral Q1H PRN   Or      LORazepam (ATIVAN) injection 1 mg 1 mg Intravenous Q1H PRN   Or      LORazepam (ATIVAN) CL 97 10/26/2018    CO2 26 10/26/2018    GLU 92 10/26/2018    CA 8.6 10/26/2018    ALB 3.4 (L) 10/26/2018    ALKPHO 86 10/26/2018    TP 6.9 10/26/2018    AST 93 (H) 10/26/2018    ALT 47 10/26/2018    PTT 30.9 10/25/2018    INR 1.1 10/25/2018    PTP 14.2 1 Result Date: 10/25/2018  CONCLUSION:  1. No acute cardiopulmonary disease. 2. No significant change has occurred.        Dictated by (CST): Rachel Angelo MD on 10/25/2018 at 10:38     Approved by (CST): Rachel Angelo MD on 10/25/2018 at 10:41 5.  Follow up with Dr. Sin Landry over the weekend        Orders This Visit:  Orders Placed This Encounter      CBC With Differential With Platelet      Basic Metabolic Panel (8)      Ethyl Alcohol      Urinalysis with Culture Reflex Once      Prothrombin T PM  Version 1 of 1    Author:  Sylvia Pallas, PTA Service:  Rehab Author Type:  Physical Therapy Assistant    Filed:  11/4/2018  4:28 PM Date of Service:  11/4/2018  4:18 PM Status:  Signed    :  Sylvia Pallas, PTA (Physical Therapy Assistant PAIN ASSESSMENT   Rating: (did not rate)  Location: L ankle  Management Techniques: Activity promotion; Body mechanics; Relaxation;Repositioning    BALANCE assistance level: modified independent with walker - rolling while maintaining LLE NWB status     Goal #2  Current Status Min A   Goal #3 Patient is able to demonstrate transfers EOB to/from [facility] at assistance level: modified independent with none   Goal #3 gait, stair negotiation, which are below the patient's pre-admission status. Consulted w/ RN prior to seeing pt for PT/OT co re-evaluation. Pt was received supine in bed. Pt completed bed mobility w/ SBA.  Pt completed sit to stand transfer w/ Min A; VC's f DTs (delirium tremens) (HCC)    Thrombocytopenia (HCC)    Anemia    Closed fracture of left ankle    Hyponatremia    Hypokalemia    Hyperglycemia    Injury of head, initial encounter    Alcohol abuse    Major depressive disorder, recurrent episode, moder OBJECTIVE[ST.1]  Precautions: (Blind)  Fall Risk: High fall risk[ST. 2]    WEIGHT BEARING RESTRICTION[ST.1]  Weight Bearing Restriction: L lower extremity           L Lower Extremity: Non-Weight Bearing[ST.2]    PAIN ASSESSMENT[ST.1]  Rating: (did not rale) Transfer training[ST.1]    Patient End of Session: Up in chair;Needs met;Call light within reach;RN aware of session/findings; All patient questions and concerns addressed[ST. 2]    CURRENT GOALSGoals to be met by: 11/17/18  Patient Goal Patient's self-state Physician Order: IP Consult to Occupational Therapy  Reason for Therapy: ADL/IADL Dysfunction and Discharge Planning    OCCUPATIONAL THERAPY ASSESSMENT     Patient is a[NH.3 57year old[NH.2] male admitted 10/25/2018 for L ankle fracture.  Pt was initially simplification techniques;ADL training;Functional transfer training;UE strengthening/ROM; Endurance training[NH.2]       OCCUPATIONAL THERAPY MEDICAL/SOCIAL HISTORY     Problem List[NH.1]   Principal Problem:    Closed fracture of left ankle, initial encoun Patient Regularly Uses: Other (Comment)(cane)[NH.2]    Stairs in Home: 4 plex, 1 stair inside, 2 RENO  Assistive Device(s) Used: white cane for blindness    Prior Level of [place]:  Mod I with ADLs, uses visual impairment cane    SUBJECTIVE  \"I don't d -   Toileting, which includes using toilet, bedpan or urinal? : A Lot  -   Putting on and taking off regular upper body clothing?: A Little  -   Taking care of personal grooming such as brushing teeth?: A Little  -   Eating meals?: A Little[NH.2]    AM-PAC No notes of this type exist for this encounter.       Immunizations     Name Date      INFLUENZA 10/11/17     INFLUENZA 01/10/17     TDAP 10/11/17

## (undated) NOTE — LETTER
[facility] [address], [place], [place]  [address]  INFORMED CONSENT FOR TRANSFUSION OF BLOOD OR BLOOD PRODUCTS   My physician has informed me of the nature, purpose, benefits and risks of transfusion for blood and blood components jemma (Signature of Patient)                                                           (Responsible party in case of Minor,                                                                                                Incompetent, or unconscious Patient)  _____

## (undated) NOTE — IP AVS SNAPSHOT
[facility]            (For Outpatient Use Only) Initial Admit Date: 10/25/2018   Inpt/Obs Admit Date: Inpt: 10/25/18 / Obs: N/A   Discharge Date:    Noris Gustafson:  [de-identified]   MRN: [de-identified]   CSN: [id number]        ENCOUNTER  Patient Cla

## (undated) NOTE — LETTER
[facility], [place]  Authorization for Invasive Procedures  1.  I hereby authorize Dr. Sindy Bazan my physician and whomever may be designated as the doctor's assistant, to perform the following operation and/or procedure: transesophagea performed for the purposes of advancing medicine, science, and/or education, provided my identity is not revealed. If the procedure has been videotaped, the physician/surgeon will obtain the original videotape.  The hospital will not be responsible for stor My signature below affirms that prior to the time of the procedure, I have explained to the patient and/or his legal representative, the risks and benefits involved in the proposed treatment and any reasonable alternative to the proposed treatment.  I have

## (undated) NOTE — LETTER
[facility]  Administration of Anesthesia  1. Darryl Starr, or _________________________________ acting on his behalf, (Patient) (Dependent/Representative) request to receive anesthesia for my pending procedure/operation/treatment.   A ph infections, high spinal block, spinal bleeding, seizure, cardiac arrest and death. 7. AWARENESS: I understand that it is possible (but unlikely) to have explicit memory of events from the operating room while under general anesthesia.   8. ELECTROCONVULSIV unconscious pt /Relationship    My signature below affirms that prior to the time of the procedure, I have explained to the patient and/or his/her guardian, the risks and benefits of undergoing anesthesia, as well as any reasonable alternatives.     _______

## (undated) NOTE — IP AVS SNAPSHOT
Patient Demographics     Address  [facility] [place] [address] Phone  [phone] [facility]  [phone] [facility] E-mail Address  [email or web address]      Emergency Contact(s)     Name Relation Home Work Misty Alfaro [phone] Take 1 tablet (1 mg total) by mouth daily.    Jeanette Blanca MD         lidocaine 5 % Ptch  Commonly known as:  LIDODERM  Next dose due:  2/8/18 dinnertime  Notes to patient:  Leave the patch on for 12 hours and off for 12 hours      Place 1 patch onto t acetaminophen 325 MG Tabs  lidocaine 5 % Ptch  magnesium hydroxide 400 MG/5ML Susp  Pantoprazole Sodium 40 MG Tbec  PEG 3350 Pack  QUEtiapine Fumarate 50 MG Tabs  sodium chloride 0.9 % SOLN 100 mL with CefTRIAXone Sodium 2 g SOLR 2 g  Thiamine HCl 100 MG T Component Value Reference Range Flag Lab   Glucose 79 70 - 99 mg/dL — [facility]   Sodium 140 136 - 144 mmol/L — [facility]   Potassium 3.7 3.3 - 5.1 mmol/L — [facility]   Chloride 101 95 - 110 mmol/L — [facility]   CO2 26 22 - 32 mmol/L [employer] URINALYSIS WITH CULTURE REFLEX [[id number]]  Resulted: 02/07/18 1827, Result status: Final result   Ordering provider:  Herberth Floyd NP  02/07/18 1157 Resulting lab:  [place] [facility]    Specimen Information    Type Source Collected On   Urine — 02/07/18 18 Specimen:  Blood from Blood,peripheral      Blood Culture Result Streptococcus mitis/oralis (A)     Blood Smear Gram positive cocci in chains    Blood Culture FREQ X 2 [[id number]]  (Abnormal)  (Susceptibility) Collected:  02/01/18 1050    Order Status:  C filed at 1/30/2018 12:36 PM       [facility] [place] Team  History and Physical     ASSESSMENT / PLAN:   40 yo male with hx blindness, HTN, ETOH abuse with hx of withdrawal ass with sz and DT , chronic thrombocytopenia,[GB. 1] chronic front[GB. 2] HL-not on med -no AC with low platelets    Dispo  -pending clinical coarse  PCP:[GB.1] RISHABH MORALES MD[GB.6]      Concerns regarding plan of care were discussed with patient. Patient agrees with plan as detailed above.  Discussed plan of care with Dr. El Vincent ABDOMEN:  Soft, BS+; nondistended; non tender;no masses;    EXTREMITIES: no edema; no cyanosis;, no calf tenderness; peripheral pulses intact       PMH[GB.1]  Past Medical History:   Diagnosis Date   • Back problem    • Blind    • ETOH abuse    • Glaucoma 72 hours. Invalid input(s): ALPHOS, TBIL, DBIL, TPROT    No results for input(s): TROP in the last 72 hours. [GB.6]    Radiology:[GB.1] Xr Mandible, Complete (min 4 Views) (cpt=70110)    Result Date: 1/27/2018  IMPRESSION:  No definite acute bony abnorma more than the 3-4 beers that he reported to myself. Lives at home alone. Normally bumps into things if someone moves something or opens a cabinet and forgets to shut it etc. Sister noticed that he began having easy bruising in the past 1-2 weeks.  Noticed f -US abdomen[GB.4]  -heme eval[GB.5]    Seizure 2/2 ETOH withdrawal  -CT head negative for acute process  -[GB. 1]per pt only  Having 2 drinks a day, ETOH level pending,[GB. 2] CIWA protocol[GB.1], will change to po meds once cleared by speech[GB.2]  -per joseph Author:  Beatriz Rodriguez NP Service:  Hospitalist Author Type:  Nurse Practitioner    Filed:  1/30/2018 12:36 PM Date of Service:  1/30/2018  7:16 AM Status:  Addendum    :  Betariz Rodriguez NP (Nurse Practitioner)    Related Notes:  Addendum by Julita Anemia/OB + stools  -hgb stable at 11's  -OB stool + in ER  -protonix  -GI consult    Hypokalemia/Hypomagnesemia  -replete via protocol    ETOH abuse  -hx of withdrawal with DT and Sz  -ETOH level[GB.1] 2[GB.4]  -GAJS[NV.8]  -thiamine, folic acid and MVI[G HEENT: normocephalic; normal nose, pharynx and TM's;[GB.1] tongue enlarged with noted injury and old blood. Noted loss of part of front teeth-per pt hockey injury from HS[GB. 2]  NECK: supple; no JVD; no carotid bruits   RESPIRATORY: normal expansion; non l MCV  79.6*  80.7   PLT  9*  19*   INR  1.1   --        Recent Labs   Lab  01/29/18   1739  01/30/18   0433   NA  134*  133*   K  3.2*  3.8   CL  96  96   CO2  22  22   BUN  4*  3*   CREATSERUM  0.52  0.53   GLU  113*  113*   CA  8.9  9.2   MG  1.5*  1.9 GB.5 - Ag Jaime NP on 1/30/2018  8:55 AM  GB. 6 - Ag Jaime NP on 1/30/2018  8:52 AM               H&P signed by Ag Jaime NP at 1/30/2018 12:35 PM  Version 3 of 5    Author:  Ag Jaime NP Service:  Hospitalist Author Type:  Cat Ferrell -plans for EGD/Colon pending improved platelet count and no further sz  -appreciate GI input[GB. 3]    Tongue Trauma 2/2 Sz  -noted large tongue, no Resp issues  -viscous lidocaine swish  -speech eval[GB.2]    Anemia/OB + stools  -hgb stable at 11's  -OB st (Temporal)   Resp 15   Ht 180.3 cm (5' 11\")   Wt 169 lb 1.6 oz (76.7 kg)   SpO2 99%   BMI 23.58 kg/m²[GB.5]     GENERAL: no apparent distress  NEUROLOGIC: A/A; Ox3: strength normal; sensations intact  SKIN:[GB.1] right hip with large ecchymosis[GB. 2]  ARLEY Review of Systems  A comprehensive 10 point review of systems was completed. Pertinent positives and negatives noted in the the HPI. DIAGNOSTIC DATA:   CBC/Chem[GB. 1]  Recent Labs   Lab  01/29/18   1739  01/30/18   0433   WBC  1.6*  2.0*   HGB  11.3* Electronically signed by Reyna Painting NP on 1/30/2018 12:35 PM   Attribution Key    GB. 1 - Marta Weinstein NP on 1/30/2018  7:16 AM  GB. 2 - Reyna Painitng NP on 1/30/2018  8:24 AM  GB. 3 - Marta Weinstein NP on 1/30/2018 12:21 PM  GB. 4 - Michael Antony -noted large tongue, no Resp issues  -viscous lidocaine swish  -speech eval[GB.2]    Anemia/OB + stools  -hgb stable at 11's  -OB stool + in ER  -protonix  -GI consult    Hypokalemia/Hypomagnesemia  -replete via protocol    ETOH abuse  -hx of withdrawal wi SKIN:[GB.1] right hip with large ecchymosis[GB. 2]  HEENT: normocephalic; normal nose, pharynx and TM's;[GB.1] tongue enlarged with noted injury and old blood. Noted loss of part of front teeth-per pt hockey injury from HS[GB. 2]  NECK: supple; no JVD; no ca 1739  01/30/18   0433   WBC  1.6*  2.0*   HGB  11.3*  11.2*   MCV  79.6*  80.7   PLT  9*  19*   INR  1.1   --        Recent Labs   Lab  01/29/18   1739  01/30/18   0433   NA  134*  133*   K  3.2*  3.8   CL  96  96   CO2  22  22   BUN  4*  3*   CREATSERUM GB.4 - Sanket Glass NP on 1/30/2018  8:52 AM               H&P signed by Sanket Glass NP at 1/30/2018  8:54 AM  Version 1 of 5    Author:  Sanket Glass NP Service:  Hospitalist Author Type:  Nurse Practitioner    Filed:  1/30/2018  8:54 AM Date -no AC with low platelets    Dispo  -pending clinical coarse  PCP:[GB.1] RISHABH MORALES MD[GB.3]      Concerns regarding plan of care were discussed with patient. Patient agrees with plan as detailed above.  Discussed plan of care with Dr. Shayna Nolasco 72 hours. Invalid input(s): ALPHOS, TBIL, DBIL, TPROT    No results for input(s): TROP in the last 72 hours. [GB.3]    Radiology:[GB.1] Xr Mandible, Complete (min 4 Views) (cpt=70110)    Result Date: 1/27/2018  IMPRESSION:  No definite acute bony abnorma ATTENDING PHYSICIAN: Gale Espinal. Shelli Martin MD   CONSULTING PHYSICIAN: Bret Allred MD   PATIENT ACCOUNT#:   [id number]    LOCATION:  [address] [place] [address] #:   [id number]       YOB: 1970  ADMISSION DATE:       01/29/2018 SKIN:  He has small black marks/papules over his finger tips and toes. PSYCHIATRIC:  The patient is cooperative. NEUROLOGIC:  The patient is moving all extremities. He is tremulous.     LABORATORY DATA:  Glucose 98, sodium 136, potassium 3.3, BUN 6, crea Admit date: 1/29/2018  Discharge date: 2/8/2018    Primary Care Physician: Jake Howe MD   Attending Physician: Elysia Higgins MD   Consults:   Consultants     Provider Role Specialty    Yesenia Fuentes 132 Physician  Cardiovascular drinking 2 alcoholic drinks per day. He denies CP, SOB, abdominal pain, N/V. Overnight pt had seizure with associated tongue trauma. He does not recall events.      Hospital Course:     Mr. Gareth Jean-Baptiste is a 40 yo male with hx blindness, HTN, ETOH abuse wit -MYRA with No evidence of vegetation on mitral valve but noted Focal calcification of chordaetendinae which is concerning for possible endocarditis  -ceftriaxone, vanco, will need IV abx on discharge-at least 4 weeks per IV of ceftriaxone with end date 3/1, -protonix  -EGD/Colon-3 colon polyps, diverticulosis, internal hemorrhoids, portal hypertensive gastropathy, grade 1 esophageal varix  -reinforced no ETOH  -follow GI prn      Hypokalemia/Hypomagnesemia  -repleted via protocol     EXAM:   GENERAL: no appar What changed:  Another medication with the same name was removed. Continue taking this medication, and follow the directions you see here.         CONTINUE taking these medications    folic acid 1 MG Tabs  Commonly known as:  FOLVITE  Take 1 tablet (1 mg to Weekly PICC line dressing change    Total Time Coordinating Care: greater  than 30 minutes    Patient had opportunity to ask questions and state understand and agree with therapeutic plan as outlined    Sharath Spann RN, NP   [facility] Hospitalist Team  Pager [phone] Neuro: Grossly normal, CN intact, sensory intact, oriented x 3 this AM, follows commands and answers questions appropriately  Psych: Affect- normal  SKIN: warm, dry, large ecchymoses over R anterior hip and R lower jaw, also with multiple scrapes/scratches  Seizure 2/2 ETOH withdrawal  -CT head negative for acute process  -per pt only  Having 2 drinks a day, ETOH level pending, CIWA protocol, will change to po meds once cleared by speech  -per neurology, provoked sz, due to chronic alcohol abuse and withdraw [facility] Hospitalist Discharge Summary   Patient ID:  Du Mai  [id number]  89 year old  4/12/1970    Admit date: 1/29/2018  Discharge date: 2/8/2018    Primary Care Physician: Karyn Zamora MD   Attending Physician: Jovanny Clarke MD   Consults:   Marito Monzon He has hx of thrombocytopenia and ETOH abuse, he states he is only drinking 2 alcoholic drinks per day. He denies CP, SOB, abdominal pain, N/V. Overnight pt had seizure with associated tongue trauma. He does not recall events.      Hospital Course:     . seen dentist in >20 years  -oral xray negative  -MYRA with No evidence of vegetation on mitral valve but noted Focal calcification of chordaetendinae which is concerning for possible endocarditis  -ceftriaxone, vanco, will need IV abx on discharge-at least  Anemia/OB + stools  -hgb stable at 11's  -OB stool + in ER  -protonix  -EGD/Colon-3 colon polyps, diverticulosis, internal hemorrhoids, portal hypertensive gastropathy, grade 1 esophageal varix  -reinforced no ETOH  -follow GI prn      Hypokalemia/Hypomag Weekly PICC line dressing change    Total Time Coordinating Care: greater  than 30 minutes    Patient had opportunity to ask questions and state understand and agree with therapeutic plan as outlined    Sharath Spann RN, NP   [facility] Hospitalist Team  Pager [phone] Physician Order: PT Eval and Treat    Presenting Problem: Thrombocytopenia, seizure  Reason for Therapy: Mobility Dysfunction and Discharge Planning    PHYSICAL THERAPY ASSESSMENT     Orders received and chart reviewed.  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 ..   -   Moving to and from a bed to a chair (including a wheelchair)?: A Little   -   Need to walk in hospital room?: A Little   -   Climbing 3-5 steps with a railing?: A Little     AM-PAC Author:  Gibran Moon OT Service:  (none) Author Type:  Occupational Therapist    Filed:  2/7/2018  2:23 PM Date of Service:  2/7/2018  2:22 PM Status:  Signed    :  Gibran Moon OT (Occupational Therapist)       Attempted to see PT in PM for OT t complete face hygiene with set up assistance. PT completed sit to stand from EOB with handheld Min A. Ambulated 10 feet Min/Mod (more assist with turns) HH assistance with VC for distance and for turning.  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 3]        Past Surgical -   Putting on and taking off regular upper body clothing?: A Little  -   Taking care of personal grooming such as brushing teeth?: A Little  -   Eating meals?: A Little[AA. 3]    AM-PAC Score:[AA.1]  Score: 16  Approx Degree of Impairment: 53.32%  Standard [place] 91. 2]         Attribution [place]z    AA. 1 - Con Leong, OT on 2/5/2018  1:28 PM  AA. 2 - Con Leong, OT on 2/5/2018  1:32 PM  AA. 3 - Shaylee Leong, OT on 2/5/2018  1:31 PM                     Video Swallow Study Notes     No notes Medication Administration Recommendations: Crushed in puree    Patient Experiencing Pain: No[CR.2]     Discharge Recommendations[CR.1]  Discharge Recommendations/Plan: Undetermined[CR.2]    Treatment Plan[CR.1]  Treatment Plan/Recommendations: Aspiration p SLP Follow-up Date: 02/06/18  Number of Visits to Meet Established Goals: 3[CR.2]    Session: 2 after BSSE.      If you have any questions, please contact Kim Ayala (SLP student)[CR.1]   Johnathan Singh MA, [address] [facility]  Speech-Language Pathologist  E

## (undated) NOTE — IP AVS SNAPSHOT
[facility]            (For Outpatient Use Only) Initial Admit Date: 1/29/2018   Inpt/Obs Admit Date: Inpt: 1/29/18 / Obs: N/A   Discharge Date:    Bennie Vega:  [de-identified]   MRN: [de-identified]   CSN: [id number]        ENCOUNTER  Patient Class

## (undated) NOTE — LETTER
[address] [place] [address] Rd, [place], [place]     AUTHORIZATION FOR SURGICAL OPERATION OR PROCEDURE    I hereby authorize Dr. Sandor Zamora, my Physician(s) and whomever may be designated as the doctor's Assistant, to perform the follow 4. I consent to the photographing of procedure(s) to be performed for the purposes of advancing medicine, science and/or education, provided my identity is not revealed.  If the procedure has been videotaped, the physician/surgeon will obtain the original v (Witness signature)                                                                                                  (Date)                                (Time)  STATEMENT OF PHYSICIAN My signature below affirms that prior to the time of the procedure;  I